# Patient Record
Sex: FEMALE | Race: WHITE | Employment: UNEMPLOYED | URBAN - METROPOLITAN AREA
[De-identification: names, ages, dates, MRNs, and addresses within clinical notes are randomized per-mention and may not be internally consistent; named-entity substitution may affect disease eponyms.]

---

## 2017-01-23 RX ORDER — FLUTICASONE PROPIONATE 50 MCG
SPRAY, SUSPENSION (ML) NASAL
Qty: 16 G | Refills: 1 | Status: SHIPPED | OUTPATIENT
Start: 2017-01-23 | End: 2017-12-05

## 2017-08-30 ENCOUNTER — TELEPHONE (OUTPATIENT)
Dept: FAMILY MEDICINE CLINIC | Facility: CLINIC | Age: 22
End: 2017-08-30

## 2017-08-30 ENCOUNTER — OFFICE VISIT (OUTPATIENT)
Dept: FAMILY MEDICINE CLINIC | Facility: CLINIC | Age: 22
End: 2017-08-30

## 2017-08-30 VITALS
DIASTOLIC BLOOD PRESSURE: 70 MMHG | HEART RATE: 76 BPM | HEIGHT: 65 IN | RESPIRATION RATE: 16 BRPM | TEMPERATURE: 98 F | WEIGHT: 123 LBS | SYSTOLIC BLOOD PRESSURE: 110 MMHG | BODY MASS INDEX: 20.49 KG/M2

## 2017-08-30 DIAGNOSIS — Z00.00 WELLNESS EXAMINATION: Primary | ICD-10-CM

## 2017-08-30 DIAGNOSIS — Z23 NEED FOR TDAP VACCINATION: ICD-10-CM

## 2017-08-30 DIAGNOSIS — Z11.1 PPD SCREENING TEST: ICD-10-CM

## 2017-08-30 PROCEDURE — 90715 TDAP VACCINE 7 YRS/> IM: CPT | Performed by: NURSE PRACTITIONER

## 2017-08-30 PROCEDURE — 99395 PREV VISIT EST AGE 18-39: CPT | Performed by: NURSE PRACTITIONER

## 2017-08-30 PROCEDURE — 86580 TB INTRADERMAL TEST: CPT | Performed by: NURSE PRACTITIONER

## 2017-08-30 PROCEDURE — 90471 IMMUNIZATION ADMIN: CPT | Performed by: NURSE PRACTITIONER

## 2017-08-30 RX ORDER — LEVONORGESTREL AND ETHINYL ESTRADIOL 0.15-0.03
KIT ORAL
Refills: 0 | COMMUNITY
Start: 2017-08-21 | End: 2017-11-04 | Stop reason: ALTCHOICE

## 2017-08-30 NOTE — PATIENT INSTRUCTIONS
Thank you for choosing ANNALISA Esparza at [place] [address]  To Do: Keith Room  1.  Go to the nurse to get ppd skin test (come back 48-72 hours) --> either can come Friday after 2:30 or Sat  (morning)--> nurse visit only  - after that--> wait 1 visit.  For your safety, read the entire package insert of all medicines prescribed to you and be aware of all of the risks of treatment even beyond those discussed today.  All therapies have potential risk of harm or side effects or medication interaction

## 2017-08-30 NOTE — PROGRESS NOTES
Wellness Exam    REASON FOR VISIT:    Sherine Martin is a 25year old female who presents for an [address].     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 Flex Sigmoidoscopy Screen  Every 5 years No results found for this or any previous visit.     Fecal Occult Blood  Annually No re Constitutional: Negative for fever, chills and fatigue. HENT: Negative for hearing loss, congestion, sore throat and neck pain. Eyes: Negative for pain and visual disturbance. Respiratory: Negative for cough and shortness of breath.     Cardiovascu affect and her behavior is normal.     ASSESSMENT AND OTHER RELEVANT CHRONIC CONDITIONS:   Dora Rodriguez is a 25year old female who presents for an [address].    Wellness examination  (primary encounter diagnosis)  Need for Tdap vaccination Influenza Annually   Pneumococcal if high risk   Td/Tdap once then every 10 years   HPV Females 11-26: 3 doses   Zoster (Shingles) 60 and older: one dose   Varicella 2 doses if not immune   MMR 1-2 doses if born after 1956 and not immune     Patient Acti

## 2017-09-01 ENCOUNTER — NURSE ONLY (OUTPATIENT)
Dept: FAMILY MEDICINE CLINIC | Facility: CLINIC | Age: 22
End: 2017-09-01

## 2017-09-01 NOTE — PROGRESS NOTES
TB test read on left forearm. 0mm induration, negative results. Patient has scheduled her 2nd TB test administration and reading.   Future Appointments  Date Time Provider Piep Trivedi   9/6/2017 2:30 PM EM[facility] 20 NURSE EMG 20 [facility] [address]   9/8/2017 3:3

## 2017-09-06 ENCOUNTER — NURSE ONLY (OUTPATIENT)
Dept: FAMILY MEDICINE CLINIC | Facility: CLINIC | Age: 22
End: 2017-09-06

## 2017-09-06 DIAGNOSIS — Z01.84 IMMUNITY STATUS TESTING: ICD-10-CM

## 2017-09-06 PROCEDURE — 86580 TB INTRADERMAL TEST: CPT | Performed by: INTERNAL MEDICINE

## 2017-09-06 NOTE — PROGRESS NOTES
Sascha Solis MA          2:54 PM   Note      Patient was seen in our office today by ANNALISA Hollis for physical for CNA program at [facility]. Patient brought in the required paperwork. 2-step TB test required.      1.  Paperwork comp

## 2017-09-06 NOTE — PROGRESS NOTES
Patient is here for part 2 of TB test.  Given intradermally to right forearm. Patient aware she must return to clinic on Friday for TB read. Paperwork is in red triage folder for completion.

## 2017-09-08 ENCOUNTER — NURSE ONLY (OUTPATIENT)
Dept: FAMILY MEDICINE CLINIC | Facility: CLINIC | Age: 22
End: 2017-09-08

## 2017-09-08 LAB — INDURATION (): 0 MM (ref 0–11)

## 2017-09-08 NOTE — PROGRESS NOTES
Progress Notes   Emilia Ortega, RN (Registered Nurse) • • Registered Nurse      TB test read on left forearm. 0mm induration, negative results. Patient has scheduled her 2nd TB test administration and reading.   Future Appointments  Date Time Provider D

## 2017-09-08 NOTE — PROGRESS NOTES
Patient is here for TB read - given 2 days ago in right forearm - area is negative for any induration. Paperwork completed and given to patient. Copy to scanning.

## 2017-09-11 NOTE — TELEPHONE ENCOUNTER
Progress Notes   Wilmer Becerril RN (Registered Nurse) • • Registered Nurse      Patient is here for TB read - given 2 days ago in right forearm - area is negative for any induration. Paperwork completed and given to patient. Copy to scanning.

## 2017-10-03 LAB — AMB EXT THINPREP PAP: NEGATIVE

## 2017-11-04 ENCOUNTER — OFFICE VISIT (OUTPATIENT)
Dept: FAMILY MEDICINE CLINIC | Facility: CLINIC | Age: 22
End: 2017-11-04

## 2017-11-04 VITALS
SYSTOLIC BLOOD PRESSURE: 102 MMHG | OXYGEN SATURATION: 98 % | WEIGHT: 123 LBS | RESPIRATION RATE: 14 BRPM | DIASTOLIC BLOOD PRESSURE: 63 MMHG | BODY MASS INDEX: 20.49 KG/M2 | HEART RATE: 72 BPM | HEIGHT: 65 IN | TEMPERATURE: 99 F

## 2017-11-04 DIAGNOSIS — H92.02 OTALGIA OF LEFT EAR: Primary | ICD-10-CM

## 2017-11-04 PROCEDURE — 99213 OFFICE O/P EST LOW 20 MIN: CPT | Performed by: PHYSICIAN ASSISTANT

## 2017-11-04 RX ORDER — NORETHINDRONE ACETATE AND ETHINYL ESTRADIOL AND FERROUS FUMARATE 1MG-20(21)
KIT ORAL
Refills: 11 | COMMUNITY
Start: 2017-10-15 | End: 2017-12-05

## 2017-11-04 NOTE — PROGRESS NOTES
CHIEF COMPLAINT:   Patient presents with:  Ear Problem    HPI:   Noa Kaur is a 25year old female who presents to clinic today with complaints of left ear pain that is intermittent. Has had for 2  weeks.  Pain is described as dull ache with pressure EARS: Tragus non tender on palpation bilaterally. External auditory canals healthy. Right TM: pearly, no bulging, no retraction, no effusion, bony landmarks sharp. Left TM: pearly, no bulging, no retraction, no effusion, bony landmarks sharp.   NOSE: nostr It often takes from several weeks up to 3 months for the fluid to clear on its own. Oral pain relievers and ear drops help if there is pain. Decongestants and antihistamines sometimes help. Antibiotics don't help since there is no infection.  Your doctor ma · Fever of 100.4°F (38°C) or higher, or as directed by your healthcare provider  · Fluid or blood draining from the ear  · Headache or sinus pain  · Stiff neck  · Unusual drowsiness or confusion  Date Last Reviewed: 10/1/2016  © 7673-0246 [employer]

## 2017-12-05 ENCOUNTER — OFFICE VISIT (OUTPATIENT)
Dept: FAMILY MEDICINE CLINIC | Facility: CLINIC | Age: 22
End: 2017-12-05

## 2017-12-05 VITALS
BODY MASS INDEX: 20.16 KG/M2 | SYSTOLIC BLOOD PRESSURE: 100 MMHG | DIASTOLIC BLOOD PRESSURE: 70 MMHG | RESPIRATION RATE: 16 BRPM | WEIGHT: 121 LBS | HEIGHT: 65 IN | TEMPERATURE: 98 F | HEART RATE: 72 BPM

## 2017-12-05 DIAGNOSIS — J01.90 ACUTE NON-RECURRENT SINUSITIS, UNSPECIFIED LOCATION: Primary | ICD-10-CM

## 2017-12-05 DIAGNOSIS — G43.109 MIGRAINE WITH AURA AND WITHOUT STATUS MIGRAINOSUS, NOT INTRACTABLE: ICD-10-CM

## 2017-12-05 PROCEDURE — 99214 OFFICE O/P EST MOD 30 MIN: CPT | Performed by: FAMILY MEDICINE

## 2017-12-05 RX ORDER — RIZATRIPTAN BENZOATE 5 MG/1
5 TABLET, ORALLY DISINTEGRATING ORAL EVERY 2 HOUR PRN
Qty: 12 TABLET | Refills: 3 | Status: SHIPPED | OUTPATIENT
Start: 2017-12-05 | End: 2019-01-28

## 2017-12-05 RX ORDER — FLUTICASONE PROPIONATE 50 MCG
SPRAY, SUSPENSION (ML) NASAL
Qty: 16 G | Refills: 3 | Status: SHIPPED | OUTPATIENT
Start: 2017-12-05 | End: 2020-08-20

## 2017-12-05 RX ORDER — AMOXICILLIN AND CLAVULANATE POTASSIUM 875; 125 MG/1; MG/1
1 TABLET, FILM COATED ORAL 2 TIMES DAILY
Qty: 14 TABLET | Refills: 0 | Status: SHIPPED | OUTPATIENT
Start: 2017-12-05 | End: 2017-12-12

## 2017-12-05 NOTE — TELEPHONE ENCOUNTER
Requesting fluticasone nasal spray  LOV: 12/5/17  RTC:   Last Relevant Labs:   Filled: 1/23/17 # 1 bottle  with 1 refills    No future appointments.

## 2017-12-05 NOTE — PROGRESS NOTES
HPI:   Kalen Martinez is a 25year old female that presents for cough, congestion for 2 weeks. She got slighty better last week then has felt worse since. She has sinus pressure, headache and nasal dischagre.   No fever, chills, myalgia, cp, sob, ear pain exudate. Mouth:  No oral lesions or ulcerations, good dentition. NECK: Supple, no cervical LAD, no thyromegaly. SKIN: No rashes, no skin lesion, no bruising, good turgor. HEART:  Regular rate and rhythm, no murmurs, rubs or gallops.   LUNGS: Clear to

## 2017-12-19 ENCOUNTER — OFFICE VISIT (OUTPATIENT)
Dept: FAMILY MEDICINE CLINIC | Facility: CLINIC | Age: 22
End: 2017-12-19

## 2017-12-19 VITALS
RESPIRATION RATE: 12 BRPM | OXYGEN SATURATION: 100 % | BODY MASS INDEX: 19.99 KG/M2 | HEIGHT: 65 IN | HEART RATE: 65 BPM | SYSTOLIC BLOOD PRESSURE: 102 MMHG | WEIGHT: 120 LBS | DIASTOLIC BLOOD PRESSURE: 60 MMHG | TEMPERATURE: 98 F

## 2017-12-19 DIAGNOSIS — R05.9 COUGH: Primary | ICD-10-CM

## 2017-12-19 DIAGNOSIS — N91.2 AMENORRHEA: ICD-10-CM

## 2017-12-19 PROCEDURE — 81025 URINE PREGNANCY TEST: CPT | Performed by: FAMILY MEDICINE

## 2017-12-19 PROCEDURE — 99214 OFFICE O/P EST MOD 30 MIN: CPT | Performed by: FAMILY MEDICINE

## 2017-12-19 RX ORDER — AZITHROMYCIN 250 MG/1
TABLET, FILM COATED ORAL
Qty: 6 TABLET | Refills: 0 | Status: SHIPPED | OUTPATIENT
Start: 2017-12-19 | End: 2018-01-15 | Stop reason: ALTCHOICE

## 2017-12-21 NOTE — PROGRESS NOTES
HPI:    Patient ID: Suzy Camilo is a 25year old female. HPI  Patient is here with complaint of cough and congestion for the past several weeks or more. She also states that not having menstrual cycle in the last 2 months.   She denies pregnancy funez Prescriptions Disp Refills    azithromycin (ZITHROMAX Z-PHAM) 250 MG Oral Tab 6 tablet 0      Si TABS DAY 1, THEN 1 TAB ON DAYS 2-5           Imaging & Referrals:  None       #[id number]

## 2018-01-15 PROBLEM — M75.22 BICEPS TENDONITIS ON LEFT: Status: ACTIVE | Noted: 2018-01-15

## 2018-02-08 ENCOUNTER — LAB ENCOUNTER (OUTPATIENT)
Dept: LAB | Age: 23
End: 2018-02-08
Attending: INTERNAL MEDICINE
Payer: COMMERCIAL

## 2018-02-08 DIAGNOSIS — K62.89 RECTAL PAIN: ICD-10-CM

## 2018-02-08 DIAGNOSIS — K92.1 HEMATOCHEZIA: ICD-10-CM

## 2018-02-08 LAB
ALBUMIN SERPL-MCNC: 4.1 G/DL (ref 3.5–4.8)
ALP LIVER SERPL-CCNC: 46 U/L (ref 52–144)
ALT SERPL-CCNC: 15 U/L (ref 14–54)
AST SERPL-CCNC: 12 U/L (ref 15–41)
BASOPHILS # BLD AUTO: 0.05 X10(3) UL (ref 0–0.1)
BASOPHILS NFR BLD AUTO: 1.1 %
BILIRUB SERPL-MCNC: 1 MG/DL (ref 0.1–2)
BUN BLD-MCNC: 12 MG/DL (ref 8–20)
C-REACTIVE PROTEIN: <0.29 MG/DL (ref ?–1)
CALCIUM BLD-MCNC: 9.3 MG/DL (ref 8.3–10.3)
CHLORIDE: 107 MMOL/L (ref 101–111)
CO2: 28 MMOL/L (ref 22–32)
CREAT BLD-MCNC: 0.7 MG/DL (ref 0.55–1.02)
EOSINOPHIL # BLD AUTO: 0.13 X10(3) UL (ref 0–0.3)
EOSINOPHIL NFR BLD AUTO: 2.9 %
ERYTHROCYTE [DISTWIDTH] IN BLOOD BY AUTOMATED COUNT: 13.1 % (ref 11.5–16)
FREE T4: 0.8 NG/DL (ref 0.9–1.8)
GLUCOSE BLD-MCNC: 81 MG/DL (ref 70–99)
HCT VFR BLD AUTO: 42.1 % (ref 34–50)
HGB BLD-MCNC: 13.9 G/DL (ref 12–16)
IMMATURE GRANULOCYTE COUNT: 0.02 X10(3) UL (ref 0–1)
IMMATURE GRANULOCYTE RATIO %: 0.4 %
IMMUNOGLOBULIN A: 92.5 MG/DL (ref 70–312)
LYMPHOCYTES # BLD AUTO: 1.53 X10(3) UL (ref 0.9–4)
LYMPHOCYTES NFR BLD AUTO: 33.9 %
M PROTEIN MFR SERPL ELPH: 7.3 G/DL (ref 6.1–8.3)
MCH RBC QN AUTO: 29 PG (ref 27–33.2)
MCHC RBC AUTO-ENTMCNC: 33 G/DL (ref 31–37)
MCV RBC AUTO: 87.9 FL (ref 81–100)
MONOCYTES # BLD AUTO: 0.34 X10(3) UL (ref 0.1–1)
MONOCYTES NFR BLD AUTO: 7.5 %
NEUTROPHIL ABS PRELIM: 2.44 X10 (3) UL (ref 1.3–6.7)
NEUTROPHILS # BLD AUTO: 2.44 X10(3) UL (ref 1.3–6.7)
NEUTROPHILS NFR BLD AUTO: 54.2 %
PLATELET # BLD AUTO: 210 10(3)UL (ref 150–450)
POTASSIUM SERPL-SCNC: 4.1 MMOL/L (ref 3.6–5.1)
RBC # BLD AUTO: 4.79 X10(6)UL (ref 3.8–5.1)
RED CELL DISTRIBUTION WIDTH-SD: 42.1 FL (ref 35.1–46.3)
SED RATE-ML: 4 MM/HR (ref 0–25)
SODIUM SERPL-SCNC: 140 MMOL/L (ref 136–144)
TSI SER-ACNC: 0.85 MIU/ML (ref 0.35–5.5)
WBC # BLD AUTO: 4.5 X10(3) UL (ref 4–13)

## 2018-02-08 PROCEDURE — 80053 COMPREHEN METABOLIC PANEL: CPT

## 2018-02-08 PROCEDURE — 83516 IMMUNOASSAY NONANTIBODY: CPT

## 2018-02-08 PROCEDURE — 86140 C-REACTIVE PROTEIN: CPT

## 2018-02-08 PROCEDURE — 82784 ASSAY IGA/IGD/IGG/IGM EACH: CPT

## 2018-02-08 PROCEDURE — 84439 ASSAY OF FREE THYROXINE: CPT

## 2018-02-08 PROCEDURE — 36415 COLL VENOUS BLD VENIPUNCTURE: CPT

## 2018-02-08 PROCEDURE — 84443 ASSAY THYROID STIM HORMONE: CPT

## 2018-02-08 PROCEDURE — 85025 COMPLETE CBC W/AUTO DIFF WBC: CPT

## 2018-02-08 PROCEDURE — 85652 RBC SED RATE AUTOMATED: CPT

## 2018-02-12 LAB
TISSUE TRANSGLUTAMINASE AB,IGA: <1.2 U/ML (ref ?–15)
TISSUE TRANSGLUTAMINASE IGA QUALITATIVE: NEGATIVE

## 2018-03-30 PROBLEM — R94.6 ABNORMAL THYROID FUNCTION TEST: Status: ACTIVE | Noted: 2018-03-30

## 2018-03-30 PROCEDURE — 84439 ASSAY OF FREE THYROXINE: CPT | Performed by: INTERNAL MEDICINE

## 2018-03-30 PROCEDURE — 84146 ASSAY OF PROLACTIN: CPT | Performed by: INTERNAL MEDICINE

## 2018-08-24 ENCOUNTER — TELEPHONE (OUTPATIENT)
Dept: NEUROLOGY | Facility: CLINIC | Age: 23
End: 2018-08-24

## 2018-08-24 ENCOUNTER — OFFICE VISIT (OUTPATIENT)
Dept: NEUROLOGY | Facility: CLINIC | Age: 23
End: 2018-08-24
Payer: COMMERCIAL

## 2018-08-24 VITALS
WEIGHT: 123 LBS | RESPIRATION RATE: 12 BRPM | HEART RATE: 60 BPM | SYSTOLIC BLOOD PRESSURE: 100 MMHG | BODY MASS INDEX: 20 KG/M2 | DIASTOLIC BLOOD PRESSURE: 60 MMHG

## 2018-08-24 DIAGNOSIS — R90.89 ABNORMAL BRAIN MRI: ICD-10-CM

## 2018-08-24 DIAGNOSIS — G43.109 MIGRAINE WITH AURA AND WITHOUT STATUS MIGRAINOSUS, NOT INTRACTABLE: Primary | ICD-10-CM

## 2018-08-24 PROCEDURE — 99244 OFF/OP CNSLTJ NEW/EST MOD 40: CPT | Performed by: OTHER

## 2018-08-24 RX ORDER — TOPIRAMATE 25 MG/1
TABLET ORAL
Qty: 60 TABLET | Refills: 2 | Status: SHIPPED | OUTPATIENT
Start: 2018-08-24 | End: 2019-01-25 | Stop reason: ALTCHOICE

## 2018-08-24 NOTE — PROGRESS NOTES
Pt is having headaches with auras.   Pt states 5-7 days a week she has headache  Pt is not taking meds due to side effects  Pt has with her an MRI of the brain

## 2018-08-24 NOTE — PROGRESS NOTES
[facility]   Neurology- INITIAL CLINIC VISIT  2018, 9:35 AM     Nataliia Krishnan Patient Status:  No patient class for patient encounter    1995 MRN [id number]   Location [address] [facility] that she has never smoked. She has never used smokeless tobacco. She reports that she does not drink alcohol or use drugs.     Allergies:  No Known Allergies    MEDICATIONS:    Current Outpatient Prescriptions:   •  topiramate 25 MG Oral Tab, Week 1: 1 tab distal muscles of the arms and legs. Deep tendon reflexes were 2 at the biceps, brachioradialis, triceps, knee jerk, and ankle jerk. Plantar responses were flexor bilaterally. Sensory exam revealed normal light touch perception.  Vibratory perception and in depth regarding the diagnosis, prognosis, treatment. The patient was given ample opportunity to ask questions. All questions and concerns were addressed.      Tiffany Mock DO  Neuromuscular and General Neurology  [facility]

## 2018-08-24 NOTE — PATIENT INSTRUCTIONS
Refill policies:    • Allow 2-3 business days for refills; controlled substances may take longer.   • Contact your pharmacy at least 5 days prior to running out of medication and have them send an electronic request or submit request through the “request re entire amount billed. Precertification and Prior Authorizations: If your physician has recommended that you have a procedure or additional testing performed.   [facility]) will contact your insurance carrier to obtain pre-certi

## 2018-09-26 ENCOUNTER — OFFICE VISIT (OUTPATIENT)
Dept: FAMILY MEDICINE CLINIC | Facility: CLINIC | Age: 23
End: 2018-09-26
Payer: COMMERCIAL

## 2018-09-26 VITALS
RESPIRATION RATE: 16 BRPM | HEART RATE: 77 BPM | WEIGHT: 124.38 LBS | TEMPERATURE: 98 F | HEIGHT: 65 IN | BODY MASS INDEX: 20.72 KG/M2 | SYSTOLIC BLOOD PRESSURE: 100 MMHG | DIASTOLIC BLOOD PRESSURE: 70 MMHG

## 2018-09-26 DIAGNOSIS — Z28.21 INFLUENZA VACCINATION DECLINED BY PATIENT: ICD-10-CM

## 2018-09-26 DIAGNOSIS — Z00.00 ANNUAL PHYSICAL EXAM: Primary | ICD-10-CM

## 2018-09-26 DIAGNOSIS — R22.1 NECK MASS: ICD-10-CM

## 2018-09-26 DIAGNOSIS — Z13.31 NEGATIVE DEPRESSION SCREENING: ICD-10-CM

## 2018-09-26 PROCEDURE — 99212 OFFICE O/P EST SF 10 MIN: CPT | Performed by: FAMILY MEDICINE

## 2018-09-26 PROCEDURE — 99395 PREV VISIT EST AGE 18-39: CPT | Performed by: FAMILY MEDICINE

## 2018-09-26 RX ORDER — DOXYCYCLINE HYCLATE 75 MG/1
1 TABLET ORAL 2 TIMES DAILY
COMMUNITY
End: 2019-01-25 | Stop reason: ALTCHOICE

## 2018-09-26 NOTE — PATIENT INSTRUCTIONS
Magnesium 400 mg nightly  Schedule neck CT scan    Prevention Guidelines, Women Ages 25 to 44  Screening tests and vaccines are an important part of managing your health.  A screening test is done to find possible disorders or diseases in people who don't Type 2 diabetes, prediabetes All women diagnosed with gestational diabetes Lifelong testing every 3 years   Type 2 diabetes All women with prediabetes Every year   Gonorrhea Sexually active women at increased risk for infection At routine exams   Hepatitis Measles, mumps, rubella (MMR) All women in this age group who have no record of these infections or vaccines 1 or 2 doses   Meningococcal Women at increased risk for infection should talk with their healthcare provider 1 or more doses   Pneumococcal conjug Date Last Reviewed: 10/1/2017  © 6183-0422 [employer]. [address] [facility], [address]. All rights reserved. This information is not intended as a substitute for professional medical care.  Always follow your healthcare professional

## 2018-09-26 NOTE — PROGRESS NOTES
Rajesh Ly is a 21year old female that presents for annual physical exam.  Patient reports having a papsmear done last year with Dr. Trevino Parents in [place], hx of HPV+ on first pap then negative on repeat. Medical records request signed.  PHQ2: 0, Ca habits    • Migraines    • Nausea    • Pain with bowel movements    • Sleep disturbance    • Uncomfortable fullness after meals    • Wears glasses      Past Surgical History:   Procedure Laterality Date   • Other  8/1/13    left wrist mass excision--Dr. Garcia nourished, in no apparent distress  SKIN: no rashes, no suspicious lesions  HEENT: atraumatic, normocephalic, ears and throat are clear, airway patent  EYES: PERRLA, EOMI, conjunctiva are clear  NECK: supple, no adenopathy, no thyromegaly or palpable nodul

## 2018-10-12 ENCOUNTER — CHARTING TRANS (OUTPATIENT)
Dept: OTHER | Age: 23
End: 2018-10-12

## 2018-10-19 ENCOUNTER — OFFICE VISIT (OUTPATIENT)
Dept: NEUROLOGY | Facility: CLINIC | Age: 23
End: 2018-10-19
Payer: COMMERCIAL

## 2018-10-19 ENCOUNTER — HOSPITAL ENCOUNTER (OUTPATIENT)
Dept: CT IMAGING | Age: 23
Discharge: HOME | End: 2018-10-19
Attending: FAMILY MEDICINE
Payer: COMMERCIAL

## 2018-10-19 VITALS
HEART RATE: 72 BPM | DIASTOLIC BLOOD PRESSURE: 60 MMHG | BODY MASS INDEX: 22 KG/M2 | RESPIRATION RATE: 14 BRPM | SYSTOLIC BLOOD PRESSURE: 110 MMHG | WEIGHT: 131 LBS

## 2018-10-19 DIAGNOSIS — G43.109 MIGRAINE WITH AURA AND WITHOUT STATUS MIGRAINOSUS, NOT INTRACTABLE: Primary | ICD-10-CM

## 2018-10-19 DIAGNOSIS — R90.89 ABNORMAL BRAIN MRI: ICD-10-CM

## 2018-10-19 DIAGNOSIS — R22.1 NECK MASS: ICD-10-CM

## 2018-10-19 PROCEDURE — 82565 ASSAY OF CREATININE: CPT

## 2018-10-19 PROCEDURE — 99213 OFFICE O/P EST LOW 20 MIN: CPT | Performed by: OTHER

## 2018-10-19 PROCEDURE — 70491 CT SOFT TISSUE NECK W/DYE: CPT | Performed by: FAMILY MEDICINE

## 2018-10-19 RX ORDER — VENLAFAXINE HYDROCHLORIDE 37.5 MG/1
CAPSULE, EXTENDED RELEASE ORAL
Qty: 60 CAPSULE | Refills: 2 | Status: SHIPPED | OUTPATIENT
Start: 2018-10-19 | End: 2018-12-25

## 2018-10-19 NOTE — PROGRESS NOTES
[facility]   Neurology- f/u     Dora Clos Patient Status:  No patient class for patient encounter    1995 MRN [id number]   Location [address] [place] [address], [address], [address] [place] [address] PCP Susie Veliz DO History:  family history includes Alcohol and Other Disorders Associated in her maternal grandfather; Prostate Cancer in her maternal grandfather; Stroke in her maternal grandmother;  Thyroid disease in her maternal aunt, maternal aunt, maternal aunt, zeeshan examination:  Mental status: Patient is alert, attentive, and oriented x 3. Language is coherent and fluent without aphasia. Memory, comprehension and ability to follow commands were intact. Cranial nerves II-XII: Optic discs were sharp.  Pupils were Jaron Lazier capsule 2     Sig: Week 1-2, take 1 capsule daily, then if tolerating, increase to 2 capsules daily          We discussed in depth regarding the diagnosis, prognosis, treatment. The patient was given ample opportunity to ask questions.  All questions and co

## 2018-10-19 NOTE — PROGRESS NOTES
The patient is here for a follow-up for migraines. The patient states she has her migraines less frequently.

## 2018-10-19 NOTE — PATIENT INSTRUCTIONS
Refill policies:    • Allow 2-3 business days for refills; controlled substances may take longer.   • Contact your pharmacy at least 5 days prior to running out of medication and have them send an electronic request or submit request through the “request re entire amount billed. Precertification and Prior Authorizations: If your physician has recommended that you have a procedure or additional testing performed.   [facility]) will contact your insurance carrier to obtain pre-certi

## 2018-12-08 VITALS
DIASTOLIC BLOOD PRESSURE: 78 MMHG | HEART RATE: 75 BPM | OXYGEN SATURATION: 100 % | SYSTOLIC BLOOD PRESSURE: 110 MMHG | HEIGHT: 65 IN | WEIGHT: 125 LBS | TEMPERATURE: 98.4 F | RESPIRATION RATE: 16 BRPM | BODY MASS INDEX: 20.83 KG/M2

## 2018-12-25 ENCOUNTER — TELEPHONE (OUTPATIENT)
Dept: NEUROLOGY | Facility: CLINIC | Age: 23
End: 2018-12-25

## 2018-12-25 DIAGNOSIS — G43.109 MIGRAINE WITH AURA AND WITHOUT STATUS MIGRAINOSUS, NOT INTRACTABLE: Primary | ICD-10-CM

## 2018-12-25 RX ORDER — VENLAFAXINE HYDROCHLORIDE 37.5 MG/1
CAPSULE, EXTENDED RELEASE ORAL
Qty: 60 CAPSULE | Refills: 0 | Status: CANCELLED | OUTPATIENT
Start: 2018-12-25

## 2018-12-26 NOTE — TELEPHONE ENCOUNTER
Medication: VENLAFAXINE HCL ER 37.5 MG Oral Capsule SR 24 Hr    Date of last refill: 10/19/18 (#60/2)  [place]    Last office visit: 10/19/2018  Due back to clinic per last office note:  Around 01/19/19  Date next of

## 2018-12-27 RX ORDER — VENLAFAXINE HYDROCHLORIDE 37.5 MG/1
CAPSULE, EXTENDED RELEASE ORAL
Qty: 180 CAPSULE | Refills: 0 | Status: SHIPPED | OUTPATIENT
Start: 2018-12-27 | End: 2019-01-28

## 2019-01-25 ENCOUNTER — OFFICE VISIT (OUTPATIENT)
Dept: NEUROLOGY | Facility: CLINIC | Age: 24
End: 2019-01-25
Payer: COMMERCIAL

## 2019-01-25 VITALS
HEART RATE: 84 BPM | DIASTOLIC BLOOD PRESSURE: 70 MMHG | SYSTOLIC BLOOD PRESSURE: 118 MMHG | HEIGHT: 65 IN | OXYGEN SATURATION: 97 % | RESPIRATION RATE: 16 BRPM | BODY MASS INDEX: 24.66 KG/M2 | WEIGHT: 148 LBS

## 2019-01-25 DIAGNOSIS — G43.109 MIGRAINE WITH AURA AND WITHOUT STATUS MIGRAINOSUS, NOT INTRACTABLE: Primary | ICD-10-CM

## 2019-01-25 PROCEDURE — 99213 OFFICE O/P EST LOW 20 MIN: CPT | Performed by: OTHER

## 2019-01-25 RX ORDER — VENLAFAXINE 75 MG/1
75 TABLET ORAL DAILY
Qty: 30 TABLET | Refills: 4 | Status: SHIPPED | OUTPATIENT
Start: 2019-01-25 | End: 2019-06-20

## 2019-01-25 NOTE — PROGRESS NOTES
[facility]   Neurology- f/u     Hudson Liu Patient Status:  No patient class for patient encounter    1995 MRN [id number]   Location ED [facility], [address], [address] [facility] PCP Danette Gordon DO fullness after meals    • Wears glasses         Past Surgical History:  Past Surgical History:   Procedure Laterality Date   • OTHER  8/1/13    left wrist mass excision--Dr. Sharp Severe       Family History:  family history includes Alcohol and Other Disorders A attentive, and oriented x 3. Language is coherent and fluent without aphasia. Memory, comprehension and ability to follow commands were intact. Cranial nerves II-XII: Optic discs were sharp. Pupils were round and reacted to light.  Extraocular movements w [address] Spivey DO  Neuromuscular and General Neurology  [facility]

## 2019-01-28 ENCOUNTER — OFFICE VISIT (OUTPATIENT)
Dept: FAMILY MEDICINE CLINIC | Facility: CLINIC | Age: 24
End: 2019-01-28
Payer: COMMERCIAL

## 2019-01-28 VITALS
HEART RATE: 75 BPM | DIASTOLIC BLOOD PRESSURE: 60 MMHG | SYSTOLIC BLOOD PRESSURE: 110 MMHG | RESPIRATION RATE: 16 BRPM | HEIGHT: 65 IN | BODY MASS INDEX: 21.22 KG/M2 | TEMPERATURE: 99 F | WEIGHT: 127.38 LBS | OXYGEN SATURATION: 98 %

## 2019-01-28 DIAGNOSIS — M79.7 FIBROMYALGIA: ICD-10-CM

## 2019-01-28 DIAGNOSIS — G43.109 MIGRAINE WITH AURA AND WITHOUT STATUS MIGRAINOSUS, NOT INTRACTABLE: ICD-10-CM

## 2019-01-28 DIAGNOSIS — Z3A.08 8 WEEKS GESTATION OF PREGNANCY: ICD-10-CM

## 2019-01-28 DIAGNOSIS — N92.6 MISSED PERIOD: Primary | ICD-10-CM

## 2019-01-28 LAB — CONTROL LINE PRESENT WITH A CLEAR BACKGROUND (YES/NO): YES YES/NO

## 2019-01-28 PROCEDURE — 99214 OFFICE O/P EST MOD 30 MIN: CPT | Performed by: FAMILY MEDICINE

## 2019-01-28 PROCEDURE — 81025 URINE PREGNANCY TEST: CPT | Performed by: FAMILY MEDICINE

## 2019-01-28 NOTE — PROGRESS NOTES
HPI:   Rolando Ramos is a 21year old female that presents with a positive home pregnancy test. Very nausea and tired, breasts tender, no bleeding or cramping.       LMP 42/3/02 (uncertain)  MATTIE 4[birth year]89   GA 8w1d   She has appt 2/7 with her OB Dr. Mary Ann Ramos distress. HEENT:     Head:  Normocephalic, atraumatic    Eyes: EOMI, PERRLA, no scleral icterus, conjunctivae clear  NECK: Supple, no cervical LAD, no thyromegaly. SKIN: No rashes, no skin lesion, no bruising, good turgor.   HEART:  Regular rate and rhyth

## 2019-01-28 NOTE — PATIENT INSTRUCTIONS
[address] [address]  [phone]    Pyridoxine (Vit B6) 10-25 mg three times a day as needed for nausea        Pregnancy    Your exam today shows that you are pregnant.   Pregnancy symptoms  During pregnancy your body will harm your baby and can cause permanent brain damage. · Don’t use recreational drugs, especially cocaine, crack, and heroin. These will harm your baby. Also avoid marijuana.   · If you were using recreational drugs or prescribed medicine when you found

## 2019-06-20 DIAGNOSIS — G43.109 MIGRAINE WITH AURA AND WITHOUT STATUS MIGRAINOSUS, NOT INTRACTABLE: Primary | ICD-10-CM

## 2019-06-21 RX ORDER — VENLAFAXINE 75 MG/1
TABLET ORAL
Qty: 30 TABLET | Refills: 2 | Status: SHIPPED | OUTPATIENT
Start: 2019-06-21 | End: 2020-08-20

## 2019-06-21 NOTE — TELEPHONE ENCOUNTER
Medication: VENLAFAXINE HCL 75 MG Oral Tab    Date of last refill: 01/25/19 (#30/4)  [place]    Last office visit: 1/25/2019  Due back to clinic per last office note:  Around 06/25/19  Date next office visit schedule

## 2019-07-07 ENCOUNTER — HOSPITAL ENCOUNTER (EMERGENCY)
Age: 24
Discharge: HOME | End: 2019-07-07
Payer: COMMERCIAL

## 2019-07-07 ENCOUNTER — APPOINTMENT (OUTPATIENT)
Dept: GENERAL RADIOLOGY | Age: 24
End: 2019-07-07
Attending: PHYSICIAN ASSISTANT
Payer: COMMERCIAL

## 2019-07-07 VITALS
OXYGEN SATURATION: 100 % | TEMPERATURE: 98 F | BODY MASS INDEX: 20.83 KG/M2 | HEIGHT: 65 IN | RESPIRATION RATE: 16 BRPM | HEART RATE: 88 BPM | DIASTOLIC BLOOD PRESSURE: 84 MMHG | SYSTOLIC BLOOD PRESSURE: 148 MMHG | WEIGHT: 125 LBS

## 2019-07-07 DIAGNOSIS — S91.339A PUNCTURE WOUND OF FOOT, UNSPECIFIED LATERALITY, INITIAL ENCOUNTER: Primary | ICD-10-CM

## 2019-07-07 PROCEDURE — 90471 IMMUNIZATION ADMIN: CPT | Performed by: PHYSICIAN ASSISTANT

## 2019-07-07 PROCEDURE — 73630 X-RAY EXAM OF FOOT: CPT | Performed by: PHYSICIAN ASSISTANT

## 2019-07-07 PROCEDURE — 99284 EMERGENCY DEPT VISIT MOD MDM: CPT | Performed by: PHYSICIAN ASSISTANT

## 2019-07-07 PROCEDURE — 0HCNXZZ EXTIRPATION OF MATTER FROM LEFT FOOT SKIN, EXTERNAL APPROACH: ICD-10-PCS | Performed by: PHYSICIAN ASSISTANT

## 2019-07-07 PROCEDURE — 10120 INC&RMVL FB SUBQ TISS SMPL: CPT | Performed by: PHYSICIAN ASSISTANT

## 2019-07-07 RX ORDER — LEVOFLOXACIN 750 MG/1
750 TABLET ORAL DAILY
Qty: 10 TABLET | Refills: 0 | Status: SHIPPED | OUTPATIENT
Start: 2019-07-07 | End: 2019-07-14

## 2019-07-07 NOTE — ED PROVIDER NOTES
Patient Seen in: THE [facility] Emergency Department In [place]    History   Patient presents with:  FB in Skin (integumentary)    Stated Complaint: splinter in foot    61-year-old  female without significant past medical history presents to the ER t Current:/84   Pulse 88   Temp 98.1 °F (36.7 °C) (Temporal)   Resp 16   Ht 165.1 cm (5' 5\")   Wt 56.7 kg   LMP 06/26/2019   SpO2 100%   BMI 20.80 kg/m²         Physical Exam   Constitutional: She appears well-developed and well-nourished.  No distress Puncture wound of foot, unspecified laterality, initial encounter  (primary encounter diagnosis)    Disposition:  Discharge  7/7/2019 12:46 pm    Follow-up:  Jareth Kay   [phone]    Sched

## 2019-09-18 DIAGNOSIS — G43.109 MIGRAINE WITH AURA AND WITHOUT STATUS MIGRAINOSUS, NOT INTRACTABLE: ICD-10-CM

## 2019-09-19 ENCOUNTER — PATIENT MESSAGE (OUTPATIENT)
Dept: NEUROLOGY | Facility: CLINIC | Age: 24
End: 2019-09-19

## 2019-09-19 NOTE — TELEPHONE ENCOUNTER
Sent the patient a Connecture message to schedule for an appt. Medication: VENLAFAXINE HCL 75 MG Oral Tab    Date of last refill: 06/21/19 (#30/2)  [place]    Last office visit: 01/25/19  Due back to clinic per last office note:  Around 06/25/19  Date next office visit scheduled:  No future appointments.     Last OV note recommendation: Per Dr. Robley Goldmann, DO:    Had side effects with nortriptyline and topamax  Effexor started for migraines and symptoms of anxiousness, both have improved since starting medication, discussed to observe for any negative changes in mood  Discussed lifestyle modifications for migraines, mainly sleep habits  Continue to use Maxalt as abortive  Counseled on prevention of medication overuse headache

## 2019-09-19 NOTE — TELEPHONE ENCOUNTER
Pt responded to ADENIKE Mckeon, concerning refill request for Venlafaxine 75mg with note below. Attempted to call pt, no answer, could not leave voicemail. Pt response below. \"Hello, I recently moved to [place], [place]. I have been wanting to wean off of this medication but I will need to get a refill in order to do that. Should I try to see a neurologist here in [place] or can someone help me over email with the weaning process?    Thanks\"

## 2019-09-19 NOTE — TELEPHONE ENCOUNTER
Duplicate request. Please refused. See other TE 09/18/19.      Medication: VENLAFAXINE HCL 75 MG Oral Tab    Date of last refill: 06/21/19 (#30/2)  [place]A    Last office visit: 0125/19  Due back to clinic per last of

## 2019-09-20 RX ORDER — VENLAFAXINE 75 MG/1
TABLET ORAL
Qty: 30 TABLET | Refills: 0 | OUTPATIENT
Start: 2019-09-20

## 2019-09-20 RX ORDER — VENLAFAXINE HYDROCHLORIDE 37.5 MG/1
37.5 CAPSULE, EXTENDED RELEASE ORAL DAILY
Qty: 10 CAPSULE | Refills: 0 | Status: SHIPPED | OUTPATIENT
Start: 2019-09-20 | End: 2019-09-30

## 2019-09-20 NOTE — TELEPHONE ENCOUNTER
From: Maya Whitehead  To: Diandra Lopez DO  Sent: 9/19/2019 9:58 PM CDT  Subject: Prescription Question    Hello, I decided I want to get off of the medication prescribed to me. . Venlafaxine XR 75mg.  Its been making me feel depressed and dizzy latel

## 2019-09-20 NOTE — PROGRESS NOTES
I recommend to switch to 37.5mg daily x 10 days, then stop. Script sent to [facility]. Please verify correct.

## 2020-03-01 ENCOUNTER — APPOINTMENT (OUTPATIENT)
Dept: CT IMAGING | Age: 25
End: 2020-03-01
Attending: EMERGENCY MEDICINE
Payer: COMMERCIAL

## 2020-03-01 ENCOUNTER — APPOINTMENT (OUTPATIENT)
Dept: ULTRASOUND IMAGING | Age: 25
End: 2020-03-01
Attending: EMERGENCY MEDICINE
Payer: COMMERCIAL

## 2020-03-01 ENCOUNTER — HOSPITAL ENCOUNTER (EMERGENCY)
Age: 25
Discharge: HOME | End: 2020-03-01
Attending: EMERGENCY MEDICINE
Payer: COMMERCIAL

## 2020-03-01 VITALS
DIASTOLIC BLOOD PRESSURE: 69 MMHG | TEMPERATURE: 99 F | WEIGHT: 130 LBS | HEART RATE: 70 BPM | RESPIRATION RATE: 18 BRPM | SYSTOLIC BLOOD PRESSURE: 119 MMHG | BODY MASS INDEX: 22 KG/M2 | OXYGEN SATURATION: 98 %

## 2020-03-01 DIAGNOSIS — N83.201 CYST OF RIGHT OVARY: Primary | ICD-10-CM

## 2020-03-01 LAB
ALBUMIN SERPL-MCNC: 4.2 G/DL (ref 3.4–5)
ALBUMIN/GLOB SERPL: 1.4 {RATIO} (ref 1–2)
ALP LIVER SERPL-CCNC: 44 U/L (ref 37–98)
ALT SERPL-CCNC: 20 U/L (ref 13–56)
ANION GAP SERPL CALC-SCNC: 7 MMOL/L (ref 0–18)
AST SERPL-CCNC: 19 U/L (ref 15–37)
BASOPHILS # BLD AUTO: 0.07 X10(3) UL (ref 0–0.2)
BASOPHILS NFR BLD AUTO: 0.8 %
BILIRUB SERPL-MCNC: 0.7 MG/DL (ref 0.1–2)
BILIRUB UR QL STRIP.AUTO: NEGATIVE
BUN BLD-MCNC: 15 MG/DL (ref 7–18)
BUN/CREAT SERPL: 20.8 (ref 10–20)
CALCIUM BLD-MCNC: 9.2 MG/DL (ref 8.5–10.1)
CHLORIDE SERPL-SCNC: 106 MMOL/L (ref 98–112)
CO2 SERPL-SCNC: 24 MMOL/L (ref 21–32)
COLOR UR AUTO: YELLOW
CREAT BLD-MCNC: 0.72 MG/DL (ref 0.55–1.02)
DEPRECATED RDW RBC AUTO: 41.3 FL (ref 35.1–46.3)
EOSINOPHIL # BLD AUTO: 0.27 X10(3) UL (ref 0–0.7)
EOSINOPHIL NFR BLD AUTO: 3.2 %
ERYTHROCYTE [DISTWIDTH] IN BLOOD BY AUTOMATED COUNT: 13.1 % (ref 11–15)
GLOBULIN PLAS-MCNC: 3.1 G/DL (ref 2.8–4.4)
GLUCOSE BLD-MCNC: 99 MG/DL (ref 70–99)
GLUCOSE UR STRIP.AUTO-MCNC: NEGATIVE MG/DL
HCT VFR BLD AUTO: 40.6 % (ref 35–48)
HGB BLD-MCNC: 13.4 G/DL (ref 12–16)
IMM GRANULOCYTES # BLD AUTO: 0.02 X10(3) UL (ref 0–1)
IMM GRANULOCYTES NFR BLD: 0.2 %
KETONES UR STRIP.AUTO-MCNC: NEGATIVE MG/DL
LEUKOCYTE ESTERASE UR QL STRIP.AUTO: NEGATIVE
LYMPHOCYTES # BLD AUTO: 2.37 X10(3) UL (ref 1–4)
LYMPHOCYTES NFR BLD AUTO: 28 %
M PROTEIN MFR SERPL ELPH: 7.3 G/DL (ref 6.4–8.2)
MCH RBC QN AUTO: 28.8 PG (ref 26–34)
MCHC RBC AUTO-ENTMCNC: 33 G/DL (ref 31–37)
MCV RBC AUTO: 87.3 FL (ref 80–100)
MONOCYTES # BLD AUTO: 0.65 X10(3) UL (ref 0.1–1)
MONOCYTES NFR BLD AUTO: 7.7 %
NEUTROPHILS # BLD AUTO: 5.08 X10 (3) UL (ref 1.5–7.7)
NEUTROPHILS # BLD AUTO: 5.08 X10(3) UL (ref 1.5–7.7)
NEUTROPHILS NFR BLD AUTO: 60.1 %
NITRITE UR QL STRIP.AUTO: NEGATIVE
OSMOLALITY SERPL CALC.SUM OF ELEC: 285 MOSM/KG (ref 275–295)
PH UR STRIP.AUTO: 7 [PH] (ref 4.5–8)
PLATELET # BLD AUTO: 210 10(3)UL (ref 150–450)
POCT LOT NUMBER: NORMAL
POCT URINE PREGNANCY: NEGATIVE
POTASSIUM SERPL-SCNC: 3.8 MMOL/L (ref 3.5–5.1)
PROT UR STRIP.AUTO-MCNC: NEGATIVE MG/DL
RBC # BLD AUTO: 4.65 X10(6)UL (ref 3.8–5.3)
RBC UR QL AUTO: NEGATIVE
SODIUM SERPL-SCNC: 137 MMOL/L (ref 136–145)
SP GR UR STRIP.AUTO: 1.02 (ref 1–1.03)
UROBILINOGEN UR STRIP.AUTO-MCNC: 0.2 MG/DL
WBC # BLD AUTO: 8.5 X10(3) UL (ref 4–11)

## 2020-03-01 PROCEDURE — 99284 EMERGENCY DEPT VISIT MOD MDM: CPT

## 2020-03-01 PROCEDURE — 81003 URINALYSIS AUTO W/O SCOPE: CPT | Performed by: EMERGENCY MEDICINE

## 2020-03-01 PROCEDURE — 74177 CT ABD & PELVIS W/CONTRAST: CPT | Performed by: EMERGENCY MEDICINE

## 2020-03-01 PROCEDURE — 96361 HYDRATE IV INFUSION ADD-ON: CPT

## 2020-03-01 PROCEDURE — 80053 COMPREHEN METABOLIC PANEL: CPT | Performed by: EMERGENCY MEDICINE

## 2020-03-01 PROCEDURE — 76856 US EXAM PELVIC COMPLETE: CPT | Performed by: EMERGENCY MEDICINE

## 2020-03-01 PROCEDURE — 93975 VASCULAR STUDY: CPT | Performed by: EMERGENCY MEDICINE

## 2020-03-01 PROCEDURE — 76830 TRANSVAGINAL US NON-OB: CPT | Performed by: EMERGENCY MEDICINE

## 2020-03-01 PROCEDURE — 96374 THER/PROPH/DIAG INJ IV PUSH: CPT

## 2020-03-01 PROCEDURE — 81025 URINE PREGNANCY TEST: CPT

## 2020-03-01 PROCEDURE — 85025 COMPLETE CBC W/AUTO DIFF WBC: CPT | Performed by: EMERGENCY MEDICINE

## 2020-03-01 RX ORDER — KETOROLAC TROMETHAMINE 30 MG/ML
15 INJECTION, SOLUTION INTRAMUSCULAR; INTRAVENOUS ONCE
Status: COMPLETED | OUTPATIENT
Start: 2020-03-01 | End: 2020-03-01

## 2020-03-01 RX ORDER — HYDROCODONE BITARTRATE AND ACETAMINOPHEN 5; 325 MG/1; MG/1
1-2 TABLET ORAL EVERY 6 HOURS PRN
Qty: 10 TABLET | Refills: 0 | Status: SHIPPED | OUTPATIENT
Start: 2020-03-01 | End: 2020-03-08

## 2020-03-01 RX ORDER — SODIUM CHLORIDE 9 MG/ML
125 INJECTION, SOLUTION INTRAVENOUS CONTINUOUS
Status: DISCONTINUED | OUTPATIENT
Start: 2020-03-01 | End: 2020-03-01

## 2020-03-01 NOTE — ED NOTES
Report received from  Cassy Rivera, [facility].

## 2020-03-01 NOTE — ED PROVIDER NOTES
Patient Seen in: [address] [facility] In [place]      History   Patient presents with:  Abdomen/Flank Pain    Stated Complaint: RLQ abd pain     HPI  Patient 63-year-old female who states last night she developed right lower quadrant abdominal Device None (Room air)       Current:/69   Pulse 70   Temp 99.2 °F (37.3 °C) (Temporal)   Resp 18   Wt 59 kg   LMP 02/09/2020   SpO2 98%   BMI 21.63 kg/m²         Physical Exam  GENERAL: Patient resting comfortably on the cart in no acute distress. appendicitis. 4.4 cm right ovarian cyst likely hemorrhagic. Trace free fluid in the cul de sac and right adnexa. .    Pelvic ultrasoundCONCLUSION:  2 cm probable hemorrhagic cyst right ovary. Doppler flow to both ovaries is demonstrated.      No sig

## 2020-08-20 ENCOUNTER — OFFICE VISIT (OUTPATIENT)
Dept: FAMILY MEDICINE CLINIC | Facility: CLINIC | Age: 25
End: 2020-08-20
Payer: COMMERCIAL

## 2020-08-20 ENCOUNTER — LAB ENCOUNTER (OUTPATIENT)
Dept: LAB | Age: 25
End: 2020-08-20
Attending: FAMILY MEDICINE
Payer: COMMERCIAL

## 2020-08-20 VITALS
TEMPERATURE: 98 F | SYSTOLIC BLOOD PRESSURE: 110 MMHG | WEIGHT: 133 LBS | DIASTOLIC BLOOD PRESSURE: 62 MMHG | RESPIRATION RATE: 16 BRPM | HEART RATE: 70 BPM | BODY MASS INDEX: 22.16 KG/M2 | HEIGHT: 65 IN

## 2020-08-20 DIAGNOSIS — Z01.84 IMMUNITY STATUS TESTING: ICD-10-CM

## 2020-08-20 DIAGNOSIS — Z13.31 NEGATIVE DEPRESSION SCREENING: ICD-10-CM

## 2020-08-20 DIAGNOSIS — Z00.00 ANNUAL PHYSICAL EXAM: ICD-10-CM

## 2020-08-20 DIAGNOSIS — Z00.00 ANNUAL PHYSICAL EXAM: Primary | ICD-10-CM

## 2020-08-20 DIAGNOSIS — E55.9 VITAMIN D DEFICIENCY: ICD-10-CM

## 2020-08-20 PROBLEM — R94.6 ABNORMAL THYROID FUNCTION TEST: Status: RESOLVED | Noted: 2018-03-30 | Resolved: 2020-08-20

## 2020-08-20 PROBLEM — M75.22 BICEPS TENDONITIS ON LEFT: Status: RESOLVED | Noted: 2018-01-15 | Resolved: 2020-08-20

## 2020-08-20 LAB
ALBUMIN SERPL-MCNC: 4.3 G/DL (ref 3.4–5)
ALBUMIN/GLOB SERPL: 1.4 {RATIO} (ref 1–2)
ALP LIVER SERPL-CCNC: 41 U/L (ref 37–98)
ALT SERPL-CCNC: 16 U/L (ref 13–56)
ANION GAP SERPL CALC-SCNC: 4 MMOL/L (ref 0–18)
AST SERPL-CCNC: 12 U/L (ref 15–37)
BASOPHILS # BLD AUTO: 0.06 X10(3) UL (ref 0–0.2)
BASOPHILS NFR BLD AUTO: 1 %
BILIRUB SERPL-MCNC: 1 MG/DL (ref 0.1–2)
BUN BLD-MCNC: 13 MG/DL (ref 7–18)
BUN/CREAT SERPL: 16.9 (ref 10–20)
CALCIUM BLD-MCNC: 9.5 MG/DL (ref 8.5–10.1)
CHLORIDE SERPL-SCNC: 106 MMOL/L (ref 98–112)
CHOLEST SMN-MCNC: 166 MG/DL (ref ?–200)
CO2 SERPL-SCNC: 29 MMOL/L (ref 21–32)
CREAT BLD-MCNC: 0.77 MG/DL (ref 0.55–1.02)
DEPRECATED RDW RBC AUTO: 41.1 FL (ref 35.1–46.3)
EOSINOPHIL # BLD AUTO: 0.37 X10(3) UL (ref 0–0.7)
EOSINOPHIL NFR BLD AUTO: 5.9 %
ERYTHROCYTE [DISTWIDTH] IN BLOOD BY AUTOMATED COUNT: 12.7 % (ref 11–15)
GLOBULIN PLAS-MCNC: 3.1 G/DL (ref 2.8–4.4)
GLUCOSE BLD-MCNC: 90 MG/DL (ref 70–99)
HCT VFR BLD AUTO: 44.1 % (ref 35–48)
HDLC SERPL-MCNC: 103 MG/DL (ref 40–59)
HGB BLD-MCNC: 14.6 G/DL (ref 12–16)
IMM GRANULOCYTES # BLD AUTO: 0.01 X10(3) UL (ref 0–1)
IMM GRANULOCYTES NFR BLD: 0.2 %
LDLC SERPL CALC-MCNC: 48 MG/DL (ref ?–100)
LYMPHOCYTES # BLD AUTO: 1.72 X10(3) UL (ref 1–4)
LYMPHOCYTES NFR BLD AUTO: 27.3 %
M PROTEIN MFR SERPL ELPH: 7.4 G/DL (ref 6.4–8.2)
MCH RBC QN AUTO: 29.4 PG (ref 26–34)
MCHC RBC AUTO-ENTMCNC: 33.1 G/DL (ref 31–37)
MCV RBC AUTO: 88.7 FL (ref 80–100)
MONOCYTES # BLD AUTO: 0.52 X10(3) UL (ref 0.1–1)
MONOCYTES NFR BLD AUTO: 8.3 %
NEUTROPHILS # BLD AUTO: 3.61 X10 (3) UL (ref 1.5–7.7)
NEUTROPHILS # BLD AUTO: 3.61 X10(3) UL (ref 1.5–7.7)
NEUTROPHILS NFR BLD AUTO: 57.3 %
NONHDLC SERPL-MCNC: 63 MG/DL (ref ?–130)
OSMOLALITY SERPL CALC.SUM OF ELEC: 288 MOSM/KG (ref 275–295)
PATIENT FASTING Y/N/NP: YES
PATIENT FASTING Y/N/NP: YES
PLATELET # BLD AUTO: 213 10(3)UL (ref 150–450)
POTASSIUM SERPL-SCNC: 4.1 MMOL/L (ref 3.5–5.1)
RBC # BLD AUTO: 4.97 X10(6)UL (ref 3.8–5.3)
SARS-COV-2 IGG SERPLBLD QL IA.RAPID: NEGATIVE
SODIUM SERPL-SCNC: 139 MMOL/L (ref 136–145)
TRIGL SERPL-MCNC: 74 MG/DL (ref 30–149)
TSI SER-ACNC: 0.73 MIU/ML (ref 0.36–3.74)
VIT D+METAB SERPL-MCNC: 67.7 NG/ML (ref 30–100)
VLDLC SERPL CALC-MCNC: 15 MG/DL (ref 0–30)
WBC # BLD AUTO: 6.3 X10(3) UL (ref 4–11)

## 2020-08-20 PROCEDURE — 99395 PREV VISIT EST AGE 18-39: CPT | Performed by: FAMILY MEDICINE

## 2020-08-20 PROCEDURE — 3074F SYST BP LT 130 MM HG: CPT | Performed by: FAMILY MEDICINE

## 2020-08-20 PROCEDURE — 3078F DIAST BP <80 MM HG: CPT | Performed by: FAMILY MEDICINE

## 2020-08-20 PROCEDURE — 3008F BODY MASS INDEX DOCD: CPT | Performed by: FAMILY MEDICINE

## 2020-08-20 PROCEDURE — 80053 COMPREHEN METABOLIC PANEL: CPT

## 2020-08-20 PROCEDURE — 86769 SARS-COV-2 COVID-19 ANTIBODY: CPT

## 2020-08-20 PROCEDURE — 80061 LIPID PANEL: CPT

## 2020-08-20 PROCEDURE — 36415 COLL VENOUS BLD VENIPUNCTURE: CPT

## 2020-08-20 PROCEDURE — 85025 COMPLETE CBC W/AUTO DIFF WBC: CPT

## 2020-08-20 PROCEDURE — 84443 ASSAY THYROID STIM HORMONE: CPT

## 2020-08-20 PROCEDURE — 82306 VITAMIN D 25 HYDROXY: CPT

## 2020-08-20 RX ORDER — ACETAMINOPHEN AND CODEINE PHOSPHATE 120; 12 MG/5ML; MG/5ML
0.35 SOLUTION ORAL
COMMUNITY
End: 2021-08-12

## 2020-08-20 NOTE — PROGRESS NOTES
Keren Hernandez is a 22year old female that presents for annual physical exam.  She would like labs done including covid ab testing, no recent illness or symptoms. Last Pap: Pap Smear,2 Years due on 10/03/2019 sees OB next week.   Hx of abnormal pap: N Years of education: Not on file      Highest education level: Not on file    Occupational History      Occupation: HOST        Employer: [employer]    Social Needs      Financial resource strain: Not on file      Food insecurity:        Worry: Not on file denies any unusual skin lesions  EYES: denies blurred vision or double vision  HEENT: denies nasal congestion, sinus pain or ST  LUNGS: denies shortness of breath with exertion  CARDIOVASCULAR: denies chest pain on exertion  GI: denies abdominal pain, orquidea healthy diet and regular exercise  - breast self awareness taught  - safe sex counseling done in detail  - pt declines vaccines today but will consider in future    2. Negative depression screening    3.  Vitamin D deficiency  - VITAMIN D, 25-HYDROXY; [employer]

## 2020-08-20 NOTE — PATIENT INSTRUCTIONS
Thank you for allowing me to participate in your care today. I will contact you with any results from today's visit. Lab results are typically available in 2-3 days for blood tests, and 3-5 days for any cultures or Paps.    Please let me know if you hav prediabetes All women with no symptoms who are overweight or obese and have 1 or more other risk factors for diabetes At least every 3 years.  Also, testing for diabetes during pregnancy after the 24th week.    Type 2 diabetes, prediabetes All women diagnos months after the first dose and the third dose given 6 months after the first dose   Influenza (flu) All women in this age group Once a year   Measles, mumps, rubella (MMR) All women in this age group who have no record of these infections or vaccines 1 or not up-to-date on their childhood vaccines should get all appropriate catch-up vaccines recommended by the CDC. Date Last Reviewed: 10/1/2017  © 1061-4756 [employer]. [address] [facility], [facility] [place]. All rights reserved.  This info

## 2021-08-12 ENCOUNTER — OFFICE VISIT (OUTPATIENT)
Dept: FAMILY MEDICINE CLINIC | Facility: CLINIC | Age: 26
End: 2021-08-12
Payer: COMMERCIAL

## 2021-08-12 ENCOUNTER — HOSPITAL ENCOUNTER (OUTPATIENT)
Dept: GENERAL RADIOLOGY | Age: 26
Discharge: HOME | End: 2021-08-12
Attending: FAMILY MEDICINE
Payer: COMMERCIAL

## 2021-08-12 VITALS
BODY MASS INDEX: 22.33 KG/M2 | OXYGEN SATURATION: 99 % | HEART RATE: 70 BPM | WEIGHT: 134 LBS | SYSTOLIC BLOOD PRESSURE: 100 MMHG | TEMPERATURE: 98 F | DIASTOLIC BLOOD PRESSURE: 60 MMHG | HEIGHT: 65 IN | RESPIRATION RATE: 16 BRPM

## 2021-08-12 DIAGNOSIS — R10.12 LUQ PAIN: Primary | ICD-10-CM

## 2021-08-12 DIAGNOSIS — R07.81 RIB PAIN ON LEFT SIDE: ICD-10-CM

## 2021-08-12 LAB
BILIRUBIN: NEGATIVE
CONTROL LINE PRESENT WITH A CLEAR BACKGROUND (YES/NO): YES YES/NO
GLUCOSE (URINE DIPSTICK): NEGATIVE MG/DL
KETONES (URINE DIPSTICK): NEGATIVE MG/DL
KIT LOT #: NORMAL NUMERIC
LEUKOCYTES: NEGATIVE
MULTISTIX LOT#: ABNORMAL NUMERIC
NITRITE, URINE: NEGATIVE
PH, URINE: 8.5 (ref 4.5–8)
PREGNANCY TEST, URINE: NEGATIVE
PROTEIN (URINE DIPSTICK): NEGATIVE MG/DL
SPECIFIC GRAVITY: 1.02 (ref 1–1.03)
URINE-COLOR: YELLOW
UROBILINOGEN,SEMI-QN: 0.2 MG/DL (ref 0–1.9)

## 2021-08-12 PROCEDURE — 3074F SYST BP LT 130 MM HG: CPT | Performed by: FAMILY MEDICINE

## 2021-08-12 PROCEDURE — 3078F DIAST BP <80 MM HG: CPT | Performed by: FAMILY MEDICINE

## 2021-08-12 PROCEDURE — 99213 OFFICE O/P EST LOW 20 MIN: CPT | Performed by: FAMILY MEDICINE

## 2021-08-12 PROCEDURE — 81002 URINALYSIS NONAUTO W/O SCOPE: CPT | Performed by: FAMILY MEDICINE

## 2021-08-12 PROCEDURE — 81025 URINE PREGNANCY TEST: CPT | Performed by: FAMILY MEDICINE

## 2021-08-12 PROCEDURE — 71046 X-RAY EXAM CHEST 2 VIEWS: CPT | Performed by: FAMILY MEDICINE

## 2021-08-12 PROCEDURE — 3008F BODY MASS INDEX DOCD: CPT | Performed by: FAMILY MEDICINE

## 2021-08-12 NOTE — PROGRESS NOTES
HPI:   Maya Whitehead is a 32year old female.     Patient presents with:  Abdominal Pain: on left side - states pain when she presses on that area - denies any N/V, diarrhea, loss of appetite    LUQ/lower rib pain chronic for 2-3 months, low level ache the normal in all 4 quadrants, no masses, no hepatosplenomegaly. EXTREMITIES:  No edema, no cyanosis, 2+ radial pulses b/l. NEURO:  Grossly normal     ASSESSMENT AND PLAN:      1. LUQ pain  2.  Rib pain on left side  - URINE PREGNANCY TEST neg  - URINALYSIS

## 2021-08-12 NOTE — PATIENT INSTRUCTIONS
Chest Wall Pain: Costochondritis    The chest pain that you have had today is caused by costochondritis. This condition is caused by an inflammation of the cartilage joining your ribs to your breastbone. It's not caused by heart or lung problems.  Your he provider, or as advised. When to seek medical advice  Call your healthcare provider right away if any of these occur:  · A change in the type of pain.  Call if it feels different, becomes more serious, lasts longer, or spreads into your shoulder, arm, [facility]

## 2021-11-10 ENCOUNTER — OFFICE VISIT (OUTPATIENT)
Dept: FAMILY MEDICINE CLINIC | Facility: CLINIC | Age: 26
End: 2021-11-10
Payer: COMMERCIAL

## 2021-11-10 ENCOUNTER — LAB ENCOUNTER (OUTPATIENT)
Dept: LAB | Age: 26
End: 2021-11-10
Attending: FAMILY MEDICINE
Payer: COMMERCIAL

## 2021-11-10 VITALS
SYSTOLIC BLOOD PRESSURE: 112 MMHG | WEIGHT: 138 LBS | TEMPERATURE: 98 F | BODY MASS INDEX: 22.99 KG/M2 | DIASTOLIC BLOOD PRESSURE: 68 MMHG | HEIGHT: 65 IN | HEART RATE: 70 BPM | RESPIRATION RATE: 16 BRPM

## 2021-11-10 DIAGNOSIS — Z28.21 INFLUENZA VACCINATION DECLINED: ICD-10-CM

## 2021-11-10 DIAGNOSIS — Z00.00 LABORATORY EXAM ORDERED AS PART OF ROUTINE GENERAL MEDICAL EXAMINATION: ICD-10-CM

## 2021-11-10 DIAGNOSIS — R19.8 GLOBUS SENSATION: Primary | ICD-10-CM

## 2021-11-10 PROCEDURE — 80061 LIPID PANEL: CPT

## 2021-11-10 PROCEDURE — 84443 ASSAY THYROID STIM HORMONE: CPT

## 2021-11-10 PROCEDURE — 85025 COMPLETE CBC W/AUTO DIFF WBC: CPT

## 2021-11-10 PROCEDURE — 3008F BODY MASS INDEX DOCD: CPT | Performed by: FAMILY MEDICINE

## 2021-11-10 PROCEDURE — 3078F DIAST BP <80 MM HG: CPT | Performed by: FAMILY MEDICINE

## 2021-11-10 PROCEDURE — 99213 OFFICE O/P EST LOW 20 MIN: CPT | Performed by: FAMILY MEDICINE

## 2021-11-10 PROCEDURE — 36415 COLL VENOUS BLD VENIPUNCTURE: CPT

## 2021-11-10 PROCEDURE — 3074F SYST BP LT 130 MM HG: CPT | Performed by: FAMILY MEDICINE

## 2021-11-10 PROCEDURE — 80053 COMPREHEN METABOLIC PANEL: CPT

## 2021-11-10 NOTE — PROGRESS NOTES
HPI:   Minna Chu is a 32year old female. Patient presents with:  Lump: x 3 days - trouble swallowing  Injection: declined flu shot    Feels more anxious and stressed lately.   Had some trouble swallowing or feeling like something is in her throat w quadrants, no masses, no hepatosplenomegaly. EXTREMITIES:  No edema, no cyanosis, 2+ radial pulses b/l. NEURO:  Grossly normal     ASSESSMENT AND PLAN:      1. Globus sensation  - normal exam, possible anxiety.   Will check labs and ctm closely, recommen

## 2022-05-18 ENCOUNTER — TELEMEDICINE (OUTPATIENT)
Dept: FAMILY MEDICINE CLINIC | Facility: CLINIC | Age: 27
End: 2022-05-18

## 2022-05-18 DIAGNOSIS — R05.9 COUGH: Primary | ICD-10-CM

## 2022-05-18 PROCEDURE — 99214 OFFICE O/P EST MOD 30 MIN: CPT | Performed by: FAMILY MEDICINE

## 2022-05-18 RX ORDER — METHYLPREDNISOLONE 4 MG/1
TABLET ORAL
Qty: 21 EACH | Refills: 0 | Status: SHIPPED | OUTPATIENT
Start: 2022-05-18

## 2022-05-18 RX ORDER — AZITHROMYCIN 250 MG/1
TABLET, FILM COATED ORAL
Qty: 6 TABLET | Refills: 0 | Status: SHIPPED | OUTPATIENT
Start: 2022-05-18 | End: 2022-05-23

## 2023-01-24 NOTE — TELEPHONE ENCOUNTER
Last colon10/19/2020- to repeat in 3 years (serrated adenoma >1 cm).     To RP- I don't think we can schedule for October yet. Ok to postpone to mid-April to address (I think we can book 6 months in advance).   OR does he need to be seen first?    Last seen by PCP 11/14/22   Patient was seen in our office today by ANNALISA Martinez for physical for CNA program at [facility]. Patient brought in the required paperwork. 2-step TB test required.      1. Paperwork completed for CNA school--> (need to put ppd skin test

## 2023-02-02 ENCOUNTER — TELEPHONE (OUTPATIENT)
Dept: FAMILY MEDICINE CLINIC | Facility: CLINIC | Age: 28
End: 2023-02-02

## 2023-02-08 ENCOUNTER — OFFICE VISIT (OUTPATIENT)
Dept: FAMILY MEDICINE CLINIC | Facility: CLINIC | Age: 28
End: 2023-02-08
Payer: COMMERCIAL

## 2023-02-08 VITALS
HEART RATE: 83 BPM | TEMPERATURE: 98 F | HEIGHT: 65.55 IN | DIASTOLIC BLOOD PRESSURE: 60 MMHG | SYSTOLIC BLOOD PRESSURE: 102 MMHG | RESPIRATION RATE: 16 BRPM | WEIGHT: 143.25 LBS | BODY MASS INDEX: 23.58 KG/M2 | OXYGEN SATURATION: 98 %

## 2023-02-08 DIAGNOSIS — Z00.00 ENCOUNTER FOR GENERAL ADULT MEDICAL EXAMINATION WITHOUT ABNORMAL FINDINGS: Primary | ICD-10-CM

## 2023-02-08 DIAGNOSIS — G43.109 MIGRAINE WITH AURA AND WITHOUT STATUS MIGRAINOSUS, NOT INTRACTABLE: ICD-10-CM

## 2023-02-08 DIAGNOSIS — Z86.39 HISTORY OF VITAMIN D DEFICIENCY: ICD-10-CM

## 2023-02-08 PROCEDURE — 3008F BODY MASS INDEX DOCD: CPT | Performed by: FAMILY MEDICINE

## 2023-02-08 PROCEDURE — 99395 PREV VISIT EST AGE 18-39: CPT | Performed by: FAMILY MEDICINE

## 2023-02-08 PROCEDURE — 3074F SYST BP LT 130 MM HG: CPT | Performed by: FAMILY MEDICINE

## 2023-02-08 PROCEDURE — 3078F DIAST BP <80 MM HG: CPT | Performed by: FAMILY MEDICINE

## 2023-03-02 ENCOUNTER — LAB ENCOUNTER (OUTPATIENT)
Dept: LAB | Age: 28
End: 2023-03-02
Attending: FAMILY MEDICINE
Payer: COMMERCIAL

## 2023-03-02 DIAGNOSIS — Z00.00 ENCOUNTER FOR GENERAL ADULT MEDICAL EXAMINATION WITHOUT ABNORMAL FINDINGS: ICD-10-CM

## 2023-03-02 DIAGNOSIS — Z86.39 HISTORY OF VITAMIN D DEFICIENCY: ICD-10-CM

## 2023-03-02 LAB
ALBUMIN SERPL-MCNC: 4.1 G/DL (ref 3.4–5)
ALBUMIN/GLOB SERPL: 1.4 {RATIO} (ref 1–2)
ALP LIVER SERPL-CCNC: 34 U/L
ALT SERPL-CCNC: 14 U/L
ANION GAP SERPL CALC-SCNC: 4 MMOL/L (ref 0–18)
AST SERPL-CCNC: 16 U/L (ref 15–37)
BASOPHILS # BLD AUTO: 0.08 X10(3) UL (ref 0–0.2)
BASOPHILS NFR BLD AUTO: 1.8 %
BILIRUB SERPL-MCNC: 0.6 MG/DL (ref 0.1–2)
BUN BLD-MCNC: 11 MG/DL (ref 7–18)
CALCIUM BLD-MCNC: 9.2 MG/DL (ref 8.5–10.1)
CHLORIDE SERPL-SCNC: 108 MMOL/L (ref 98–112)
CHOLEST SERPL-MCNC: 170 MG/DL (ref ?–200)
CO2 SERPL-SCNC: 29 MMOL/L (ref 21–32)
CREAT BLD-MCNC: 0.72 MG/DL
EOSINOPHIL # BLD AUTO: 0.36 X10(3) UL (ref 0–0.7)
EOSINOPHIL NFR BLD AUTO: 8.2 %
ERYTHROCYTE [DISTWIDTH] IN BLOOD BY AUTOMATED COUNT: 12.4 %
FASTING PATIENT LIPID ANSWER: YES
FASTING STATUS PATIENT QL REPORTED: YES
GFR SERPLBLD BASED ON 1.73 SQ M-ARVRAT: 117 ML/MIN/1.73M2 (ref 60–?)
GLOBULIN PLAS-MCNC: 3 G/DL (ref 2.8–4.4)
GLUCOSE BLD-MCNC: 88 MG/DL (ref 70–99)
HCT VFR BLD AUTO: 41.6 %
HDLC SERPL-MCNC: 93 MG/DL (ref 40–59)
HGB BLD-MCNC: 13.7 G/DL
IMM GRANULOCYTES # BLD AUTO: 0 X10(3) UL (ref 0–1)
IMM GRANULOCYTES NFR BLD: 0 %
LDLC SERPL CALC-MCNC: 65 MG/DL (ref ?–100)
LYMPHOCYTES # BLD AUTO: 2.04 X10(3) UL (ref 1–4)
LYMPHOCYTES NFR BLD AUTO: 46.6 %
MCH RBC QN AUTO: 30 PG (ref 26–34)
MCHC RBC AUTO-ENTMCNC: 32.9 G/DL (ref 31–37)
MCV RBC AUTO: 91.2 FL
MONOCYTES # BLD AUTO: 0.42 X10(3) UL (ref 0.1–1)
MONOCYTES NFR BLD AUTO: 9.6 %
NEUTROPHILS # BLD AUTO: 1.48 X10 (3) UL (ref 1.5–7.7)
NEUTROPHILS # BLD AUTO: 1.48 X10(3) UL (ref 1.5–7.7)
NEUTROPHILS NFR BLD AUTO: 33.8 %
NONHDLC SERPL-MCNC: 77 MG/DL (ref ?–130)
OSMOLALITY SERPL CALC.SUM OF ELEC: 291 MOSM/KG (ref 275–295)
PLATELET # BLD AUTO: 214 10(3)UL (ref 150–450)
POTASSIUM SERPL-SCNC: 3.8 MMOL/L (ref 3.5–5.1)
PROT SERPL-MCNC: 7.1 G/DL (ref 6.4–8.2)
RBC # BLD AUTO: 4.56 X10(6)UL
SODIUM SERPL-SCNC: 141 MMOL/L (ref 136–145)
TRIGL SERPL-MCNC: 63 MG/DL (ref 30–149)
TSI SER-ACNC: 0.96 MIU/ML (ref 0.36–3.74)
VIT D+METAB SERPL-MCNC: 33.6 NG/ML (ref 30–100)
VLDLC SERPL CALC-MCNC: 9 MG/DL (ref 0–30)
WBC # BLD AUTO: 4.4 X10(3) UL (ref 4–11)

## 2023-03-02 PROCEDURE — 85025 COMPLETE CBC W/AUTO DIFF WBC: CPT

## 2023-03-02 PROCEDURE — 80061 LIPID PANEL: CPT

## 2023-03-02 PROCEDURE — 80053 COMPREHEN METABOLIC PANEL: CPT

## 2023-03-02 PROCEDURE — 84443 ASSAY THYROID STIM HORMONE: CPT

## 2023-03-02 PROCEDURE — 82306 VITAMIN D 25 HYDROXY: CPT

## 2023-03-02 PROCEDURE — 36415 COLL VENOUS BLD VENIPUNCTURE: CPT

## 2023-04-26 ENCOUNTER — APPOINTMENT (OUTPATIENT)
Dept: URBAN - METROPOLITAN AREA CLINIC 247 | Age: 28
Setting detail: DERMATOLOGY
End: 2023-04-26

## 2023-04-26 DIAGNOSIS — L81.1 CHLOASMA: ICD-10-CM

## 2023-04-26 DIAGNOSIS — D18.0 HEMANGIOMA: ICD-10-CM

## 2023-04-26 DIAGNOSIS — L81.4 OTHER MELANIN HYPERPIGMENTATION: ICD-10-CM

## 2023-04-26 DIAGNOSIS — D22 MELANOCYTIC NEVI: ICD-10-CM

## 2023-04-26 PROBLEM — D22.4 MELANOCYTIC NEVI OF SCALP AND NECK: Status: ACTIVE | Noted: 2023-04-26

## 2023-04-26 PROBLEM — D22.5 MELANOCYTIC NEVI OF TRUNK: Status: ACTIVE | Noted: 2023-04-26

## 2023-04-26 PROBLEM — D18.01 HEMANGIOMA OF SKIN AND SUBCUTANEOUS TISSUE: Status: ACTIVE | Noted: 2023-04-26

## 2023-04-26 PROCEDURE — OTHER PRESCRIPTION MEDICATION MANAGEMENT: OTHER

## 2023-04-26 PROCEDURE — 99214 OFFICE O/P EST MOD 30 MIN: CPT

## 2023-04-26 PROCEDURE — OTHER MIPS QUALITY: OTHER

## 2023-04-26 PROCEDURE — OTHER PRESCRIPTION: OTHER

## 2023-04-26 PROCEDURE — OTHER COUNSELING: OTHER

## 2023-04-26 RX ORDER — HYDROQUINONE 40 MG/G
CREAM TOPICAL
Qty: 28.35 | Refills: 3 | Status: ERX | COMMUNITY
Start: 2023-04-26

## 2023-04-26 RX ORDER — TRETIONIN 0.25 MG/G
CREAM TOPICAL QHS
Qty: 20 | Refills: 3 | Status: ERX | COMMUNITY
Start: 2023-04-26

## 2023-04-26 ASSESSMENT — LOCATION ZONE DERM
LOCATION ZONE: FACE
LOCATION ZONE: TRUNK
LOCATION ZONE: SCALP
LOCATION ZONE: ARM

## 2023-04-26 ASSESSMENT — LOCATION SIMPLE DESCRIPTION DERM
LOCATION SIMPLE: LEFT SHOULDER
LOCATION SIMPLE: CHIN
LOCATION SIMPLE: ABDOMEN
LOCATION SIMPLE: CHEST
LOCATION SIMPLE: LEFT SCALP

## 2023-04-26 ASSESSMENT — LOCATION DETAILED DESCRIPTION DERM
LOCATION DETAILED: LEFT POSTERIOR SHOULDER
LOCATION DETAILED: LEFT CHIN
LOCATION DETAILED: LEFT MEDIAL FRONTAL SCALP
LOCATION DETAILED: EPIGASTRIC SKIN
LOCATION DETAILED: MIDDLE STERNUM

## 2023-04-26 NOTE — PROCEDURE: PRESCRIPTION MEDICATION MANAGEMENT
Render In Strict Bullet Format?: No
Detail Level: Zone
Initiate Treatment: Tretinoin 0.025% cream QHS as tolerated \\nHydroquinone BID for 8 weeks on 8 weeks off- Good rx coupon attached to prescriptions.

## 2023-04-26 NOTE — PROCEDURE: MIPS QUALITY
Quality 226: Preventive Care And Screening: Tobacco Use: Screening And Cessation Intervention: Patient screened for tobacco use and is an ex/non-smoker
Quality 402: Tobacco Use And Help With Quitting Among Adolescents: Patient screened for tobacco and never smoked
Detail Level: Detailed
Quality 110: Preventive Care And Screening: Influenza Immunization: Influenza Immunization previously received during influenza season
Burow's Advancement Flap Text: The defect edges were debeveled with a #15 scalpel blade.  Given the location of the defect and the proximity to free margins a Burow's advancement flap was deemed most appropriate.  Using a sterile surgical marker, the appropriate advancement flap was drawn incorporating the defect and placing the expected incisions within the relaxed skin tension lines where possible.    The area thus outlined was incised deep to adipose tissue with a #15 scalpel blade.  The skin margins were undermined to an appropriate distance in all directions utilizing iris scissors.

## 2023-04-26 NOTE — PROCEDURE: COUNSELING
Detail Level: Simple
Detail Level: Zone
Sunscreen Recommendations: SFP 50+ daily and reapply every 2 hours when in direct sunlight
Sunscreen Recommendations: SPF 30+ daily, broad spectrum
Sunscreen Recommendations: SPF 30+, broad spectrum
Patient Specific Counseling (Will Not Stick From Patient To Patient): Stressed the importance of daily SPF 30+

## 2023-09-19 ENCOUNTER — OFFICE VISIT (OUTPATIENT)
Dept: FAMILY MEDICINE CLINIC | Facility: CLINIC | Age: 28
End: 2023-09-19
Payer: COMMERCIAL

## 2023-09-19 ENCOUNTER — TELEPHONE (OUTPATIENT)
Dept: FAMILY MEDICINE CLINIC | Facility: CLINIC | Age: 28
End: 2023-09-19

## 2023-09-19 VITALS
TEMPERATURE: 98 F | HEIGHT: 65 IN | BODY MASS INDEX: 24.16 KG/M2 | DIASTOLIC BLOOD PRESSURE: 62 MMHG | OXYGEN SATURATION: 98 % | RESPIRATION RATE: 16 BRPM | WEIGHT: 145 LBS | SYSTOLIC BLOOD PRESSURE: 100 MMHG | HEART RATE: 94 BPM

## 2023-09-19 DIAGNOSIS — J06.9 VIRAL URI: Primary | ICD-10-CM

## 2023-09-19 DIAGNOSIS — Z33.1 PREGNANT STATE, INCIDENTAL: ICD-10-CM

## 2023-09-19 PROCEDURE — 99213 OFFICE O/P EST LOW 20 MIN: CPT | Performed by: FAMILY MEDICINE

## 2023-09-19 PROCEDURE — 3074F SYST BP LT 130 MM HG: CPT | Performed by: FAMILY MEDICINE

## 2023-09-19 PROCEDURE — 3078F DIAST BP <80 MM HG: CPT | Performed by: FAMILY MEDICINE

## 2023-09-19 PROCEDURE — 3008F BODY MASS INDEX DOCD: CPT | Performed by: FAMILY MEDICINE

## 2023-09-19 NOTE — TELEPHONE ENCOUNTER
Returned call to patient who states her symptoms started Saturday with a bad frontal/facial HA and pressure. Had a low grade fever of 99.5 which has resolved. Now has nasal congestion with yellow-green nasal drainage and PND. Has a little dry cough. Did a covid test yesterday that was negative. Is seven weeks pregnant and unsure what OTC meds are safe. Advised OK to take tylenol. Can try lying in quiet dark room with cool cloth on her face for bad HA. Instructed to wear a mask when she come into the office.

## 2023-09-19 NOTE — TELEPHONE ENCOUNTER
From: Tho Pavon   Sent: 9/18/2023   9:22 PM CDT   To: [facility] Front [facility]   Subject: Appointment scheduled from [address]                Appointment For: Conrado Severs ([id number])   Visit Type: [address] [place] [address] ([id number])      9/19/2023    1:00 PM  20 mins. [address] [place] [address] Rd            [facility] [id number] [address]      Patient Comments:   Sinus infection     [facility] requesting return call to nurse with symptom detail.

## 2023-09-19 NOTE — PROGRESS NOTES
Symptoms started on Saturday;  HA, congested,  Low grade fever that night; Sig pressure in her sinuses,

## 2024-05-01 ENCOUNTER — APPOINTMENT (OUTPATIENT)
Dept: URBAN - METROPOLITAN AREA CLINIC 247 | Age: 29
Setting detail: DERMATOLOGY
End: 2024-05-01

## 2024-05-01 DIAGNOSIS — L91.8 OTHER HYPERTROPHIC DISORDERS OF THE SKIN: ICD-10-CM

## 2024-05-01 DIAGNOSIS — D18.0 HEMANGIOMA: ICD-10-CM

## 2024-05-01 DIAGNOSIS — D485 NEOPLASM OF UNCERTAIN BEHAVIOR OF SKIN: ICD-10-CM

## 2024-05-01 DIAGNOSIS — D22 MELANOCYTIC NEVI: ICD-10-CM

## 2024-05-01 DIAGNOSIS — L81.4 OTHER MELANIN HYPERPIGMENTATION: ICD-10-CM

## 2024-05-01 PROBLEM — D22.5 MELANOCYTIC NEVI OF TRUNK: Status: ACTIVE | Noted: 2024-05-01

## 2024-05-01 PROBLEM — D18.01 HEMANGIOMA OF SKIN AND SUBCUTANEOUS TISSUE: Status: ACTIVE | Noted: 2024-05-01

## 2024-05-01 PROBLEM — D48.5 NEOPLASM OF UNCERTAIN BEHAVIOR OF SKIN: Status: ACTIVE | Noted: 2024-05-01

## 2024-05-01 PROCEDURE — OTHER SKIN TAG REMOVAL: OTHER

## 2024-05-01 PROCEDURE — 99213 OFFICE O/P EST LOW 20 MIN: CPT | Mod: 25

## 2024-05-01 PROCEDURE — OTHER BIOPSY BY SHAVE METHOD: OTHER

## 2024-05-01 PROCEDURE — OTHER COUNSELING: OTHER

## 2024-05-01 PROCEDURE — 11102 TANGNTL BX SKIN SINGLE LES: CPT

## 2024-05-01 PROCEDURE — OTHER MIPS QUALITY: OTHER

## 2024-05-01 PROCEDURE — 11200 RMVL SKIN TAGS UP TO&INC 15: CPT | Mod: 59

## 2024-05-01 ASSESSMENT — LOCATION DETAILED DESCRIPTION DERM
LOCATION DETAILED: RIGHT ANTERIOR PROXIMAL THIGH
LOCATION DETAILED: RIGHT MID-UPPER BACK
LOCATION DETAILED: RIGHT ANTERIOR SHOULDER
LOCATION DETAILED: RIGHT AXILLARY VAULT
LOCATION DETAILED: RIGHT INFERIOR UPPER BACK
LOCATION DETAILED: LEFT SUPERIOR UPPER BACK

## 2024-05-01 ASSESSMENT — LOCATION ZONE DERM
LOCATION ZONE: TRUNK
LOCATION ZONE: ARM
LOCATION ZONE: AXILLAE
LOCATION ZONE: LEG

## 2024-05-01 ASSESSMENT — LOCATION SIMPLE DESCRIPTION DERM
LOCATION SIMPLE: RIGHT AXILLARY VAULT
LOCATION SIMPLE: LEFT UPPER BACK
LOCATION SIMPLE: RIGHT SHOULDER
LOCATION SIMPLE: RIGHT THIGH
LOCATION SIMPLE: RIGHT UPPER BACK

## 2024-05-01 NOTE — PROCEDURE: BIOPSY BY SHAVE METHOD
Detail Level: Detailed
Render Post-Care Instructions In Note?: no
X Size Of Lesion In Cm: 0
Hemostasis: Drysol
Electrodesiccation And Curettage Text: The wound bed was treated with electrodesiccation and curettage after the biopsy was performed.
Type Of Destruction Used: Curettage
Biopsy Type: H and E
Post-Care Instructions: Patient is to keep the biopsy site dry overnight. Then wash daily with gentle soap and water and replace the bandage and Vaseline daily. It is important to keep the site greasy and covered.
Depth Of Biopsy: dermis
Anesthesia Type: 1% lidocaine without epinephrine
Silver Nitrate Text: The wound bed was treated with silver nitrate after the biopsy was performed.
Wound Care: Petrolatum
Billing Type: Patient Bill
Curettage Text: The wound bed was treated with curettage after the biopsy was performed.
Biopsy Method: Personna blade
Dressing: bandage
Was A Bandage Applied: Yes
Cryotherapy Text: The wound bed was treated with cryotherapy after the biopsy was performed.
Body Location Override (Optional - Billing Will Still Be Based On Selected Body Map Location If Applicable): right lateral proximal thigh
Consent: Written consent was obtained for risks including but not limited to scarring, infection, bleeding, incomplete removal, nerve damage and allergy to anesthesia.
Notification Instructions: Patient will be notified of biopsy results. However, patient should call the office if not contacted within 2 weeks.
Anesthesia Volume In Cc: 0.5
Size Of Lesion In Cm: 0.4
Path Notes (To The Dermatopathologist): Check margins
Information: Selecting Yes will display possible errors in your note based on the variables you have selected. This validation is only offered as a suggestion for you. PLEASE NOTE THAT THE VALIDATION TEXT WILL BE REMOVED WHEN YOU FINALIZE YOUR NOTE. IF YOU WANT TO FAX A PRELIMINARY NOTE YOU WILL NEED TO TOGGLE THIS TO 'NO' IF YOU DO NOT WANT IT IN YOUR FAXED NOTE.
Lab: [id number]
Electrodesiccation Text: The wound bed was treated with electrodesiccation after the biopsy was performed.

## 2024-05-01 NOTE — PROCEDURE: SKIN TAG REMOVAL
Medical Necessity Information: It is in your best interest to select a reason for this procedure from the list below. All of these items fulfill various CMS LCD requirements except the new and changing color options.
Include Z78.9 (Other Specified Conditions Influencing Health Status) As An Associated Diagnosis?: No
Add Associated Diagnoses If Applicable When Selecting Medical Necessity: Yes
Medical Necessity Clause: This procedure was medically necessary because the lesions that were treated were:
Detail Level: Detailed
Anesthesia Type: 1% lidocaine without epinephrine
Anesthesia Volume In Cc: 0.3
Consent: Written consent obtained and the risks of skin tag removal was reviewed with the patient including but not limited to bleeding, pigmentary change, infection, pain, and remote possibility of scarring.

## 2025-01-10 ENCOUNTER — HOSPITAL ENCOUNTER (OUTPATIENT)
Age: 30
Discharge: HOME | End: 2025-01-10
Attending: EMERGENCY MEDICINE
Payer: COMMERCIAL

## 2025-01-10 ENCOUNTER — E-VISIT (OUTPATIENT)
Dept: TELEHEALTH | Age: 30
End: 2025-01-10

## 2025-01-10 VITALS
OXYGEN SATURATION: 97 % | RESPIRATION RATE: 18 BRPM | TEMPERATURE: 98 F | HEART RATE: 70 BPM | DIASTOLIC BLOOD PRESSURE: 75 MMHG | SYSTOLIC BLOOD PRESSURE: 122 MMHG

## 2025-01-10 DIAGNOSIS — R10.2 VAGINAL PAIN: Primary | ICD-10-CM

## 2025-01-10 DIAGNOSIS — N64.4 BREAST PAIN: Primary | ICD-10-CM

## 2025-01-10 PROCEDURE — 99212 OFFICE O/P EST SF 10 MIN: CPT

## 2025-01-10 PROCEDURE — 99203 OFFICE O/P NEW LOW 30 MIN: CPT

## 2025-01-10 NOTE — DISCHARGE INSTRUCTIONS
Take Tylenol or Motrin as needed for pain  Apply warm compresses to the area  Follow-up with your primary care doctor  Return if any worsening symptoms or new concern

## 2025-01-10 NOTE — ED INITIAL ASSESSMENT (HPI)
Left breast pain while baby is feeding, now painful to touch. Daughter was seen at pediatrician for diaper rash, got prescribed and ointment. Per the patient was told to be seen if she started to experience any pain.

## 2025-01-10 NOTE — ED PROVIDER NOTES
Patient Seen in: [facility]      History     Chief Complaint   Patient presents with    Breast Pain     Stated Complaint: eval g    Subjective:   HPI    29-year-old female presents to the immediate care for complaints of breast pain.  Patient noticed that she had some breast irritation starting earlier today.  Denies any fevers or chills.  Denies any erythema swelling or warmth to her breast.  Denies any bloody discharge.    Objective:     Past Medical History:    Abdominal pain    Allergic rhinitis    Anxiety    Bloating    Constipation    Diarrhea, unspecified    Fatigue    Flatulence/gas pain/belching    Hemorrhoids    Irregular bowel habits    Migraines    Nausea    Pain with bowel movements    Sleep disturbance    Uncomfortable fullness after meals    Wears glasses              Past Surgical History:   Procedure Laterality Date    Other  08/01/2013    left wrist mass excision--Dr. Ibarra    Other surgical history  Cyst in wrist removed                Social History     Socioeconomic History    Marital status:     Number of children: 0   Occupational History    Occupation: HOST     Employer: [employer]   Tobacco Use    Smoking status: Never    Smokeless tobacco: Never    Tobacco comments:     None   Vaping Use    Vaping status: Never Used   Substance and Sexual Activity    Alcohol use: Not Currently     Alcohol/week: 3.0 standard drinks of alcohol     Comment: occasionally    Drug use: Not Currently     Types: Cannabis    Sexual activity: Yes     Partners: Male   Other Topics Concern    Caffeine Concern No    Stress Concern No    Weight Concern No    Special Diet Yes     Comment: Vegetarian    Exercise No    Seat Belt No              Review of Systems    Positive for stated complaint: eval g  Other systems are as noted in HPI.  Constitutional and vital signs reviewed.      All other systems reviewed and negative except as noted above.    Physical Exam     ED Triage Vitals [01/10/25 1706]    /75   Pulse 70   Resp 18   Temp 98.3 °F (36.8 °C)   Temp src Oral   SpO2 97 %   O2 Device None (Room air)       Current Vitals:   Vital Signs  BP: 122/75  Pulse: 70  Resp: 18  Temp: 98.3 °F (36.8 °C)  Temp src: Oral    Oxygen Therapy  SpO2: 97 %  O2 Device: None (Room air)        Physical Exam  General: Alert and oriented. No acute distress.  HEENT: Normocephalic. No evidence of trauma. Extraocular movements are intact.  Breast exam: There is no evidence of any erythematous welling or induration to the breast.  There is no purulent drainage or fluid expressed from the nipple.  There is no bloody discharge noted.  Neuro: No focal deficit is noted.    ED Course   Labs Reviewed - No data to display  No obvious source of infection noted on examination.  No evidence of mastitis at this time.  We would recommend close observation.  Follow-up if any worsening symptoms or new concerns     MDM   Patient was screened and evaluated during this visit.   As a treating physician attending to the patient, I determined, within reasonable clinical confidence and prior to discharge, that an emergency medical condition was not or was no longer present.  There was no indication for further evaluation, treatment or admission on an emergency basis.  Comprehensive verbal and written discharge and follow-up instructions were provided to help prevent relapse or worsening.  Patient was instructed to follow-up with her primary care provider for further evaluation and treatment, but to return immediately to the ER for worsening, concerning, new, changing or persisting symptoms.  I discussed the case with the patient and they had no questions, complaints, or concerns.  Patient felt comfortable going home.    ^^Please note that this report has been produced using speech recognition software and may contain errors related to that system including, but not limited to, errors in grammar, punctuation, and spelling, as well as words and  phrases that possibly may have been recognized inappropriately.  If there are any questions or concerns, contact the dictating provider for clarification      Medical Decision Making      Disposition and Plan     Clinical Impression:  1. Breast pain         Disposition:  Discharge  1/10/2025  5:23 pm    Follow-up:  No follow-up provider specified.        Medications Prescribed:  There are no discharge medications for this patient.          Supplementary Documentation:

## 2025-01-10 NOTE — PROGRESS NOTES
Teodoro Camara is a 29 year old female.  HPI:   See answers to questions above.     No current outpatient medications on file.      Past Medical History:    Abdominal pain    Allergic rhinitis    Anxiety    Bloating    Constipation    Diarrhea, unspecified    Fatigue    Flatulence/gas pain/belching    Hemorrhoids    Irregular bowel habits    Migraines    Nausea    Pain with bowel movements    Sleep disturbance    Uncomfortable fullness after meals    Wears glasses      Past Surgical History:   Procedure Laterality Date    Other  08/01/2013    left wrist mass excision--Dr. Ibarra    Other surgical history  Cyst in wrist removed      Family History   Problem Relation Age of Onset    Stroke Maternal Grandmother     Prostate Cancer Maternal Grandfather     Alcohol and Other Disorders Associated Maternal Grandfather     Thyroid disease Mother     Asthma Mother     Thyroid disease Maternal Aunt     Thyroid disease Maternal Aunt     Thyroid disease Maternal Aunt     Thyroid disease Paternal Aunt     Cancer Paternal Grandfather     Breast Cancer Neg     Colon Cancer Neg     Ovarian Cancer Neg       Social History:  Social History     Socioeconomic History    Marital status:     Number of children: 0   Occupational History    Occupation: Safety [employer]     Employer: [employer]   Tobacco Use    Smoking status: Never    Smokeless tobacco: Never    Tobacco comments:     None   Vaping Use    Vaping status: Never Used   Substance and Sexual Activity    Alcohol use: Not Currently     Alcohol/week: 3.0 standard drinks of alcohol     Comment: occasionally    Drug use: Not Currently     Types: Cannabis    Sexual activity: Yes     Partners: Male   Other Topics Concern    Caffeine Concern No    Stress Concern No    Weight Concern No    Special Diet Yes     Comment: Vegetarian    Exercise No    Seat Belt No         ASSESSMENT AND PLAN:     Encounter Diagnosis   Name Primary?    Vaginal pain Yes       Advised in person evaluation at IC or ER  for further eval of vaginal pain with clear discharge and lower back pain.    Meds & Refills for this Visit:  Requested Prescriptions      No prescriptions requested or ordered in this encounter       Duration of  the service:  2 minutes

## 2025-05-01 ENCOUNTER — APPOINTMENT (OUTPATIENT)
Dept: URBAN - METROPOLITAN AREA CLINIC 247 | Age: 30
Setting detail: DERMATOLOGY
End: 2025-05-01

## 2025-05-01 DIAGNOSIS — L81.4 OTHER MELANIN HYPERPIGMENTATION: ICD-10-CM

## 2025-05-01 DIAGNOSIS — D18.0 HEMANGIOMA: ICD-10-CM

## 2025-05-01 DIAGNOSIS — D22 MELANOCYTIC NEVI: ICD-10-CM

## 2025-05-01 DIAGNOSIS — B35.1 TINEA UNGUIUM: ICD-10-CM

## 2025-05-01 DIAGNOSIS — Z87.2 PERSONAL HISTORY OF DISEASES OF THE SKIN AND SUBCUTANEOUS TISSUE: ICD-10-CM

## 2025-05-01 PROBLEM — D18.01 HEMANGIOMA OF SKIN AND SUBCUTANEOUS TISSUE: Status: ACTIVE | Noted: 2025-05-01

## 2025-05-01 PROBLEM — D22.5 MELANOCYTIC NEVI OF TRUNK: Status: ACTIVE | Noted: 2025-05-01

## 2025-05-01 PROCEDURE — OTHER COUNSELING: OTHER

## 2025-05-01 PROCEDURE — OTHER PRESCRIPTION: OTHER

## 2025-05-01 PROCEDURE — OTHER MIPS QUALITY: OTHER

## 2025-05-01 PROCEDURE — 99213 OFFICE O/P EST LOW 20 MIN: CPT

## 2025-05-01 PROCEDURE — OTHER PRESCRIPTION MEDICATION MANAGEMENT: OTHER

## 2025-05-01 RX ORDER — EFINACONAZOLE 100 MG/ML
SOLUTION TOPICAL
Qty: 8 | Refills: 11 | Status: ERX | COMMUNITY
Start: 2025-05-01

## 2025-05-01 ASSESSMENT — LOCATION ZONE DERM
LOCATION ZONE: TRUNK
LOCATION ZONE: TOENAIL

## 2025-05-01 ASSESSMENT — LOCATION DETAILED DESCRIPTION DERM
LOCATION DETAILED: RIGHT GREAT TOENAIL
LOCATION DETAILED: LEFT SUPERIOR UPPER BACK
LOCATION DETAILED: RIGHT MID-UPPER BACK
LOCATION DETAILED: LEFT GREAT TOENAIL
LOCATION DETAILED: RIGHT INFERIOR UPPER BACK

## 2025-05-01 ASSESSMENT — LOCATION SIMPLE DESCRIPTION DERM
LOCATION SIMPLE: RIGHT UPPER BACK
LOCATION SIMPLE: LEFT GREAT TOE
LOCATION SIMPLE: RIGHT GREAT TOE
LOCATION SIMPLE: LEFT UPPER BACK

## 2025-05-01 NOTE — PROCEDURE: PRESCRIPTION MEDICATION MANAGEMENT
Render In Strict Bullet Format?: No
Initiate Treatment: Jublia 10 % topical solution QHS
Detail Level: Zone

## (undated) NOTE — ED AVS SNAPSHOT
Isabela Alexis   MRN: [id number]    Department:  [facility] Emergency Department in [place]   Date of Visit:  3/1/2020           Disclosure     Insurance plans vary and the physician(s) referred by the ER may not be covered by your plan.  Please contact tell this physician (or your personal doctor if your instructions are to return to your personal doctor) about any new or lasting problems. The primary care or specialist physician will see patients referred from the [facility] Emergency Department.  Shelton Lombard

## (undated) NOTE — LETTER
Date: 12/19/2017    Patient Name: Janina Morgan          To Whom it may concern:      Please excuse from work 12/19/17 and 12/20/17 due to illness.          Sincerely,    Lia Eason MD

## (undated) NOTE — ED AVS SNAPSHOT
Sherine Martin   MRN: [id number]    Department:  [facility] Emergency Department in [place]   Date of Visit:  7/7/2019           Disclosure     Insurance plans vary and the physician(s) referred by the ER may not be covered by your plan.  Please contact tell this physician (or your personal doctor if your instructions are to return to your personal doctor) about any new or lasting problems. The primary care or specialist physician will see patients referred from the [facility] Emergency Department.  Regan Sneed